# Patient Record
Sex: MALE | Race: WHITE
[De-identification: names, ages, dates, MRNs, and addresses within clinical notes are randomized per-mention and may not be internally consistent; named-entity substitution may affect disease eponyms.]

---

## 2017-12-28 ENCOUNTER — RECORDS - HEALTHEAST (OUTPATIENT)
Dept: ADMINISTRATIVE | Facility: OTHER | Age: 57
End: 2017-12-28

## 2018-01-25 ENCOUNTER — TRANSFERRED RECORDS (OUTPATIENT)
Dept: HEALTH INFORMATION MANAGEMENT | Facility: CLINIC | Age: 58
End: 2018-01-25

## 2018-03-14 VITALS
HEART RATE: 89 BPM | DIASTOLIC BLOOD PRESSURE: 77 MMHG | TEMPERATURE: 98.1 F | OXYGEN SATURATION: 98 % | RESPIRATION RATE: 18 BRPM | SYSTOLIC BLOOD PRESSURE: 109 MMHG
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
## 2018-03-14 PROBLEM — R13.12 OROPHARYNGEAL DYSPHAGIA: Status: ACTIVE | Noted: 2018-03-14

## 2018-03-14 PROBLEM — J96.01 ACUTE RESPIRATORY FAILURE WITH HYPOXIA (H): Status: ACTIVE | Noted: 2018-03-14

## 2018-03-14 PROBLEM — J95.821 RESPIRATORY FAILURE FOLLOWING TRAUMA AND SURGERY (H): Status: ACTIVE | Noted: 2018-03-14

## 2018-03-14 PROBLEM — I82.409 DVT OF LOWER EXTREMITY (DEEP VENOUS THROMBOSIS) (H): Status: ACTIVE | Noted: 2018-03-14

## 2018-03-14 PROBLEM — E46 MALNUTRITION, UNSPECIFIED TYPE (H): Status: ACTIVE | Noted: 2018-03-14

## 2018-03-14 PROBLEM — G93.40 ACUTE ENCEPHALOPATHY: Status: ACTIVE | Noted: 2018-03-14

## 2018-03-14 PROBLEM — Z93.0 TRACHEOSTOMY IN PLACE (H): Status: ACTIVE | Noted: 2018-03-14

## 2018-03-14 PROBLEM — J18.9 HAP (HOSPITAL-ACQUIRED PNEUMONIA): Status: ACTIVE | Noted: 2018-03-14

## 2018-03-14 RX ORDER — ESCITALOPRAM OXALATE 10 MG/1
10 TABLET ORAL DAILY
COMMUNITY
End: 2018-03-18

## 2018-03-14 RX ORDER — MIRTAZAPINE 15 MG/1
TABLET, FILM COATED ORAL AT BEDTIME
COMMUNITY
End: 2018-03-18

## 2018-03-14 RX ORDER — LANOLIN ALCOHOL/MO/W.PET/CERES
3 CREAM (GRAM) TOPICAL AT BEDTIME
COMMUNITY
End: 2018-07-19

## 2018-03-14 RX ORDER — GUAR GUM
1 PACKET (EA) ORAL 2 TIMES DAILY PRN
COMMUNITY

## 2018-03-14 RX ORDER — PRAVASTATIN SODIUM 20 MG
40 TABLET ORAL AT BEDTIME
COMMUNITY

## 2018-03-14 RX ORDER — POLYETHYLENE GLYCOL 3350 17 G/17G
17 POWDER, FOR SOLUTION ORAL 2 TIMES DAILY PRN
COMMUNITY

## 2018-03-14 RX ORDER — GABAPENTIN 100 MG/1
100 CAPSULE ORAL 2 TIMES DAILY
COMMUNITY

## 2018-03-14 RX ORDER — TRAZODONE HYDROCHLORIDE 50 MG/1
50 TABLET, FILM COATED ORAL
COMMUNITY
End: 2018-04-11

## 2018-03-15 ENCOUNTER — NURSING HOME VISIT (OUTPATIENT)
Dept: GERIATRICS | Facility: CLINIC | Age: 58
End: 2018-03-15
Payer: MEDICAID

## 2018-03-15 DIAGNOSIS — F43.21 ADJUSTMENT DISORDER WITH DEPRESSED MOOD: ICD-10-CM

## 2018-03-15 DIAGNOSIS — G91.9 HYDROCEPHALUS (H): ICD-10-CM

## 2018-03-15 DIAGNOSIS — Z59.00 HOMELESS: ICD-10-CM
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
## 2018-03-15 DIAGNOSIS — G62.9 NEUROPATHY: ICD-10-CM

## 2018-03-15 DIAGNOSIS — E46 MALNUTRITION, UNSPECIFIED TYPE (H): ICD-10-CM

## 2018-03-15 DIAGNOSIS — J95.821 RESPIRATORY FAILURE FOLLOWING TRAUMA AND SURGERY (H): ICD-10-CM

## 2018-03-15 DIAGNOSIS — Z98.2 S/P VENTRICULOPERITONEAL SHUNT: ICD-10-CM

## 2018-03-15 DIAGNOSIS — I60.9 SAH (SUBARACHNOID HEMORRHAGE) (H): Primary | ICD-10-CM

## 2018-03-15 DIAGNOSIS — Z98.890 STATUS POST COIL EMBOLIZATION OF CEREBRAL ANEURYSM: ICD-10-CM

## 2018-03-15 DIAGNOSIS — E78.5 HYPERLIPIDEMIA, UNSPECIFIED HYPERLIPIDEMIA TYPE: ICD-10-CM

## 2018-03-15 DIAGNOSIS — K59.01 SLOW TRANSIT CONSTIPATION: ICD-10-CM

## 2018-03-15 DIAGNOSIS — R52 PAIN: ICD-10-CM

## 2018-03-15 DIAGNOSIS — J18.9 HAP (HOSPITAL-ACQUIRED PNEUMONIA): ICD-10-CM

## 2018-03-15 DIAGNOSIS — I82.5Z1 CHRONIC DEEP VEIN THROMBOSIS (DVT) OF DISTAL VEIN OF RIGHT LOWER EXTREMITY (H): ICD-10-CM

## 2018-03-15 DIAGNOSIS — Z93.0 STATUS POST TRACHEOSTOMY (H): ICD-10-CM

## 2018-03-15 DIAGNOSIS — R53.81 PHYSICAL DECONDITIONING: ICD-10-CM

## 2018-03-15 DIAGNOSIS — I82.C21 INTERNAL JUGULAR (IJ) VEIN THROMBOEMBOLISM, CHRONIC, RIGHT (H): ICD-10-CM

## 2018-03-15 DIAGNOSIS — G47.01 INSOMNIA DUE TO MEDICAL CONDITION: ICD-10-CM

## 2018-03-15 DIAGNOSIS — R13.12 OROPHARYNGEAL DYSPHAGIA: ICD-10-CM

## 2018-03-15 PROCEDURE — 99310 SBSQ NF CARE HIGH MDM 45: CPT | Performed by: NURSE PRACTITIONER

## 2018-03-15 SDOH — ECONOMIC STABILITY - HOUSING INSECURITY: HOMELESSNESS UNSPECIFIED: Z59.00

## 2018-03-15 NOTE — LETTER
3/15/2018        RE: Cornelius Wolfe  [address] [facility] [address]        [facility]    PRIMARY CARE PROVIDER AND CLINIC:  No Ref-Primary, Physician     Chief Complaint   Patient presents with     Hospital F/U       HPI:    Cornelius Wolfe is a 57 year old  (1960),admitted to the [facility] from [facility];.  Hospital stay 1/25/2018 through 3/13/2018.  Admitted to this facility for  rehab, medical management and nursing care.  HPI information obtained from: facility chart records, facility staff and patient report.       SUMMARY OF DISCHARGE COURSE         ________________________________________  Current issues are:    SAH (subarachnoid hemorrhage) (H)  Status post coil embolization of cerebral aneurysm  As noted above  Patient independent at this point in most ADLs requiring a less intense level of therapies prior to d/c back into the community  Continues with neuropathic pain to hands as noted below  PT/OT following in facility:  PT:    WBAT    No assistive devices    Ambulating 15' x2, 50' with SBA    Independent with transfers    NU Step x 15' level 1 with B LE and UE use, then decreased to LE use only after a few minutes d/t hand discomfort/numbness    Standing ex x 20 reps with B UE support on //bars    Seated 3# B LE PRE's x20 reps and GTB HS Curls x 20 reps.  OT:    CPT MED BOX- 5.0/6.0    SAFETY-  17 / 18    OT administered the SLUMS to pt.  Pt. scored 20/30.  This score suggests dementia cognition.    Pt. scored 22/30 on the MMSE-2.  This score suggests Mild cognitive impairment.      Hydrocephalus  S/P ventriculoperitoneal shunt  As noted above  Denies headaches or increased difficulties with balance; no noted confusion  Baseline cognitive impairment as noted above    Respiratory failure following trauma and surgery (H)  Status post tracheostomy (H)  HAP (hospital-acquired pneumonia)  Denies cough/SOB/TORRES today  Trach scar healed well w/o  difficulty    Oropharyngeal dysphagia  As noted above  Patient tolerating regular diet without difficulty    Malnutrition, unspecified type (H)  Admit weight 166lbs  Diet as noted above   Does receive house supplement BID  Dietary following    Chronic deep vein thrombosis (DVT) of distal vein of right lower extremity (H)  Internal jugular (IJ) vein thromboembolism, chronic, right (H)  As noted above  No current anticoagulation 2/2 hemorrhage as noted above    Homeless  2/2 to extended hospitalization  He states he did have his own apartment prior to this event, but he no longer has this.  He notes that he has been working with his sons at locating a place for him to move following his stay in the facility - He is educated on the assistance of SW and relocation that is available to assist him    Neuropathy  2/2 to subarachnoid hemorrhage as noted above  Baseline in hands and increases with activity as noted with therapy notes  Currently on regimen of Gabapentin and PRN Tylenol #2  Did speak with him about potentially increasing the Gabapentin for increased control, he declines this at this time and would like to see how things change with therapies    Insomnia due to medical condition  Adjustment disorder with depressed mood  Admitted on regimen of Lexapro, Remeron, Trazodone and Melatonin  He declined both Lexapro and Remeron upon arrival to the facility, he notes that he is stressed and slightly depressed at times but feels as though he is able to cope with these things at this time without these medications on board  Did speak with him about depression being completely normal following his extensive health concerns since December and assured him that we would be here to work with him through these things and that we also have in-house psychology/psychiatry available to him as he wishes  Will continue Remeron and Melatonin at bed to help with sleeping    Hyperlipidemia, unspecified hyperlipidemia  type  Chronic  TRIGLYCERIDES (12/27/2017 3:48 AM)  TRIGLYCERIDES (12/27/2017 3:48 AM)   Component Value Ref Range   TRIGLYCERIDES 77 <150 mg/dL   On regimen of Pravachol     Slow transit constipation  Patient denies difficulties with constipation today  On regimen of Miralax and Nutrisource fiber    Pain  2/2 above noted diagnoses and hosptializations  Managed on Tylenol #3 and Gabapentin as noted above    Physical deconditioning  2/2 above noted diagnoses and hospitalizations    CODE STATUS/ADVANCE DIRECTIVES DISCUSSION:   CPR/Full code   Patient's living condition: lives alone    ALLERGIES:No known allergies  PAST MEDICAL HISTORY:  has no past medical history on file.  PAST SURGICAL HISTORY:  has no past surgical history on file.  FAMILY HISTORY: family history is not on file.  SOCIAL HISTORY:      Post Discharge Medication Reconciliation Status: discharge medications reconciled, continue medications without change.  Current Outpatient Prescriptions   Medication Sig Dispense Refill     acetaminophen-codeine (TYLENOL #3) 300-30 MG per tablet Take 1-2 tablets by mouth every 4 hours as needed for moderate pain       gabapentin (NEURONTIN) 100 MG capsule Take 100 mg by mouth 2 times daily       escitalopram (LEXAPRO) 10 MG tablet Take 10 mg by mouth daily       melatonin 3 MG tablet Take 3 mg by mouth At Bedtime       polyethylene glycol (MIRALAX/GLYCOLAX) powder Take 17 g by mouth 2 times daily as needed for constipation       mirtazapine (REMERON) 15 MG tablet Take by mouth At Bedtime       fiber modular (NUTRISOURCE FIBER) packet 1 packet 2 times daily as needed       pravastatin (PRAVACHOL) 20 MG tablet Take 40 mg by mouth At Bedtime       traZODone (DESYREL) 50 MG tablet Take 50 mg by mouth nightly as needed for sleep         ROS:  10 point ROS of systems including Constitutional, Eyes, Respiratory, Cardiovascular, Gastroenterology, Genitourinary, Integumentary, Muscularskeletal, Psychiatric were all negative  except for pertinent positives noted in my HPI.    Exam:  /77  Pulse 89  Temp 98.1  F (36.7  C)  Resp 18  SpO2 98%  GENERAL APPEARANCE:  Alert, in no distress, oriented, thin, somnolent, cooperative, middle-aged man coming into room from dining room after lunch  ENT:  Mouth and posterior oropharynx normal, moist mucous membranes, normal hearing acuity, poor dentition  EYES:  EOM, conjunctivae, lids, pupils and irises normal, wears glasses  NECK:  No adenopathy,masses or thyromegaly, trach scar present  RESP:  respiratory effort and palpation of chest normal, lungs clear to auscultation , no respiratory distress  CV:  Palpation and auscultation of heart done , regular rate and rhythm, no murmur, rub, or gallop, no edema, +2 pedal pulses  ABDOMEN:  normal bowel sounds, soft, nontender, no hepatosplenomegaly or other masses  M/S:   Gait and station normal  Digits and nails normal  SKIN:  Inspection of skin and subcutaneous tissue baseline, Palpation of skin and subcutaneous tissue baseline  NEURO:   Cranial nerves 2-12 are normal tested and grossly at patient's baseline  PSYCH:  oriented X 3, normal insight, judgement and memory, affect and mood normal, flat, withdrawn     BP Readin/68, 99/62            HR:      Lab/Diagnostic data:  Hemoglobin (2018 9:10 AM)  Hemoglobin (2018 9:10 AM)   Component Value Ref Range   Hemoglobin 12.5 (L) 14.0 - 18.0 g/dL   Cortisol (2018 8:49 AM)  Cortisol (2018 8:49 AM)   Component Value Ref Range   Cortisol 5.9 ug/dL   Basic Metabolic Panel (2018 9:20 AM)  Basic Metabolic Panel (2018 9:20 AM)   Component Value Ref Range   Sodium 137 136 - 145 mmol/L   Potassium 4.3 3.5 - 5.0 mmol/L   Chloride 103 98 - 107 mmol/L   CO2 24 22 - 31 mmol/L   Anion Gap, Calculation 10 5 - 18 mmol/L   Glucose 154 (H) 70 - 125 mg/dL   Calcium 9.7 8.5 - 10.5 mg/dL   BUN 23 (H) 8 - 22 mg/dL   Creatinine 0.80 0.70 - 1.30 mg/dL   GFR MDRD Af Amer >60 >60  mL/min/1.73m2   GFR MDRD Non Af Amer >60 >60 mL/min/1.73m2   TSH (03/01/2018 9:20 AM)  TSH (03/01/2018 9:20 AM)   Component Value Ref Range   TSH 3.43 0.30 - 5.00 uIU/mL   HM2(CBC w/o Differential) (02/23/2018 9:04 AM)  HM2(CBC w/o Differential) (02/23/2018 9:04 AM)   Component Value Ref Range   WBC 10.8 4.0 - 11.0 thou/uL   RBC 3.91 (L) 4.40 - 6.20 mill/uL   Hemoglobin 12.6 (L) 14.0 - 18.0 g/dL   Hematocrit 38.0 (L) 40.0 - 54.0 %   MCV 97 80 - 100 fL   MCH 32.2 27.0 - 34.0 pg   MCHC 33.2 32.0 - 36.0 g/dL   RDW 13.9 11.0 - 14.5 %   Platelets 343 140 - 440 thou/uL   MPV 11.8 8.5 - 12.5 fL       ASSESSMENT/PLAN:   Diagnosis Comments   1. SAH (subarachnoid hemorrhage) (H)   Status post coil embolization of cerebral aneurysm  Patient followed closely by Neurology  Working with therapies - adv per their recommendations  Stable at this time  F/u with Neurology per their recommendations   2. Hydrocephalus    S/P ventriculoperitoneal shunt     Stable  VSS   3. Respiratory failure following trauma and surgery (H)    Status post tracheostomy (H)    HAP (hospital-acquired pneumonia)     No respiratory concerns at this time  Resolved  CBC   4. Oropharyngeal dysphagia Admitted on a regular diet  Doesn't appear to be having ongoing difficulties with dysphagia at this time   5. Malnutrition, unspecified type (H)  Dietary involvement  Continue to monitor patient's weight  Adapt as needed  BMP   6. Chronic deep vein thrombosis (DVT) of distal vein of right lower extremity (H)    Internal jugular (IJ) vein thromboembolism, chronic, right (H)     No anticoagulation as noted above  Continue to monitor for s/sx of recurrence   7. Homeless  Working with sons/SW on placement at this time   8. Neuropathy  Managed on regimen of Gabapentin with good relief   9. Insomnia due to medical condition  Adjustment disorder with depressed mood Declines in-house psych involvement at this time; did reassure their availability to him as he wishes  Was  admitted on regimen of Lexapro and Remeron - he has declined these medications since admission to facility; please monitor for ongoing/increased sx of depression with cessation of this medication  Continues on regimen of Trazodone and Melatonin    10. Hyperlipidemia, unspecified hyperlipidemia type  Chronic  Fasting lipid panel  Continue Pravachol as ordered   11. Slow transit constipation Stable on current regimen; continue medications as currently ordered.   12. Pain  Stable on current regimen; continue medications as currently ordered.   13. Physical deconditioning  PT/OT - adv per their recommendations     Electronically signed by:  SHANICE Markham CNP      Sincerely,        SHANICE Markham CNP

## 2018-03-15 NOTE — PROGRESS NOTES
[facility]    PRIMARY CARE PROVIDER AND CLINIC:  No Ref-Primary, Physician     Chief Complaint   Patient presents with     Hospital F/U       HPI:    Cornelius Wolfe is a 57 year old  (1960),admitted to the [facility] from [facility];.  Hospital stay 1/25/2018 through 3/13/2018.  Admitted to this facility for  rehab, medical management and nursing care.  HPI information obtained from: facility chart records, facility staff and patient report.       SUMMARY OF DISCHARGE COURSE         ________________________________________  Current issues are:    SAH (subarachnoid hemorrhage) (H)  Status post coil embolization of cerebral aneurysm  As noted above  Patient independent at this point in most ADLs requiring a less intense level of therapies prior to d/c back into the community  Continues with neuropathic pain to hands as noted below  PT/OT following in facility:  PT:    WBAT    No assistive devices    Ambulating 15' x2, 50' with SBA    Independent with transfers    NU Step x 15' level 1 with B LE and UE use, then decreased to LE use only after a few minutes d/t hand discomfort/numbness    Standing ex x 20 reps with B UE support on //bars    Seated 3# B LE PRE's x20 reps and GTB HS Curls x 20 reps.  OT:    CPT MED BOX- 5.0/6.0    SAFETY-  17 / 18    OT administered the SLUMS to pt.  Pt. scored 20/30.  This score suggests dementia cognition.    Pt. scored 22/30 on the MMSE-2.  This score suggests Mild cognitive impairment.      Hydrocephalus  S/P ventriculoperitoneal shunt  As noted above  Denies headaches or increased difficulties with balance; no noted confusion  Baseline cognitive impairment as noted above    Respiratory failure following trauma and surgery (H)  Status post tracheostomy (H)  HAP (hospital-acquired pneumonia)  Denies cough/SOB/TORRES today  Trach scar healed well w/o difficulty    Oropharyngeal dysphagia  As noted above  Patient tolerating regular  diet without difficulty    Malnutrition, unspecified type (H)  Admit weight 166lbs  Diet as noted above   Does receive house supplement BID  Dietary following    Chronic deep vein thrombosis (DVT) of distal vein of right lower extremity (H)  Internal jugular (IJ) vein thromboembolism, chronic, right (H)  As noted above  No current anticoagulation 2/2 hemorrhage as noted above    Homeless  2/2 to extended hospitalization  He states he did have his own apartment prior to this event, but he no longer has this.  He notes that he has been working with his sons at locating a place for him to move following his stay in the facility - He is educated on the assistance of SW and relocation that is available to assist him    Neuropathy  2/2 to subarachnoid hemorrhage as noted above  Baseline in hands and increases with activity as noted with therapy notes  Currently on regimen of Gabapentin and PRN Tylenol #2  Did speak with him about potentially increasing the Gabapentin for increased control, he declines this at this time and would like to see how things change with therapies    Insomnia due to medical condition  Adjustment disorder with depressed mood  Admitted on regimen of Lexapro, Remeron, Trazodone and Melatonin  He declined both Lexapro and Remeron upon arrival to the facility, he notes that he is stressed and slightly depressed at times but feels as though he is able to cope with these things at this time without these medications on board  Did speak with him about depression being completely normal following his extensive health concerns since December and assured him that we would be here to work with him through these things and that we also have in-house psychology/psychiatry available to him as he wishes  Will continue Remeron and Melatonin at bed to help with sleeping    Hyperlipidemia, unspecified hyperlipidemia type  Chronic  TRIGLYCERIDES (12/27/2017 3:48 AM)  TRIGLYCERIDES (12/27/2017 3:48 AM)   Component  Value Ref Range   TRIGLYCERIDES 77 <150 mg/dL   On regimen of Pravachol     Slow transit constipation  Patient denies difficulties with constipation today  On regimen of Miralax and Nutrisource fiber    Pain  2/2 above noted diagnoses and hosptializations  Managed on Tylenol #3 and Gabapentin as noted above    Physical deconditioning  2/2 above noted diagnoses and hospitalizations    CODE STATUS/ADVANCE DIRECTIVES DISCUSSION:   CPR/Full code   Patient's living condition: lives alone    ALLERGIES:No known allergies  PAST MEDICAL HISTORY:  has no past medical history on file.  PAST SURGICAL HISTORY:  has no past surgical history on file.  FAMILY HISTORY: family history is not on file.  SOCIAL HISTORY:      Post Discharge Medication Reconciliation Status: discharge medications reconciled, continue medications without change.  Current Outpatient Prescriptions   Medication Sig Dispense Refill     acetaminophen-codeine (TYLENOL #3) 300-30 MG per tablet Take 1-2 tablets by mouth every 4 hours as needed for moderate pain       gabapentin (NEURONTIN) 100 MG capsule Take 100 mg by mouth 2 times daily       escitalopram (LEXAPRO) 10 MG tablet Take 10 mg by mouth daily       melatonin 3 MG tablet Take 3 mg by mouth At Bedtime       polyethylene glycol (MIRALAX/GLYCOLAX) powder Take 17 g by mouth 2 times daily as needed for constipation       mirtazapine (REMERON) 15 MG tablet Take by mouth At Bedtime       fiber modular (NUTRISOURCE FIBER) packet 1 packet 2 times daily as needed       pravastatin (PRAVACHOL) 20 MG tablet Take 40 mg by mouth At Bedtime       traZODone (DESYREL) 50 MG tablet Take 50 mg by mouth nightly as needed for sleep         ROS:  10 point ROS of systems including Constitutional, Eyes, Respiratory, Cardiovascular, Gastroenterology, Genitourinary, Integumentary, Muscularskeletal, Psychiatric were all negative except for pertinent positives noted in my HPI.    Exam:  /77  Pulse 89  Temp 98.1  F (36.7   C)  Resp 18  SpO2 98%  GENERAL APPEARANCE:  Alert, in no distress, oriented, thin, somnolent, cooperative, middle-aged man coming into room from dining room after lunch  ENT:  Mouth and posterior oropharynx normal, moist mucous membranes, normal hearing acuity, poor dentition  EYES:  EOM, conjunctivae, lids, pupils and irises normal, wears glasses  NECK:  No adenopathy,masses or thyromegaly, trach scar present  RESP:  respiratory effort and palpation of chest normal, lungs clear to auscultation , no respiratory distress  CV:  Palpation and auscultation of heart done , regular rate and rhythm, no murmur, rub, or gallop, no edema, +2 pedal pulses  ABDOMEN:  normal bowel sounds, soft, nontender, no hepatosplenomegaly or other masses  M/S:   Gait and station normal  Digits and nails normal  SKIN:  Inspection of skin and subcutaneous tissue baseline, Palpation of skin and subcutaneous tissue baseline  NEURO:   Cranial nerves 2-12 are normal tested and grossly at patient's baseline  PSYCH:  oriented X 3, normal insight, judgement and memory, affect and mood normal, flat, withdrawn     BP Readin/68, 99/62            HR:      Lab/Diagnostic data:  Hemoglobin (2018 9:10 AM)  Hemoglobin (2018 9:10 AM)   Component Value Ref Range   Hemoglobin 12.5 (L) 14.0 - 18.0 g/dL   Cortisol (2018 8:49 AM)  Cortisol (2018 8:49 AM)   Component Value Ref Range   Cortisol 5.9 ug/dL   Basic Metabolic Panel (2018 9:20 AM)  Basic Metabolic Panel (2018 9:20 AM)   Component Value Ref Range   Sodium 137 136 - 145 mmol/L   Potassium 4.3 3.5 - 5.0 mmol/L   Chloride 103 98 - 107 mmol/L   CO2 24 22 - 31 mmol/L   Anion Gap, Calculation 10 5 - 18 mmol/L   Glucose 154 (H) 70 - 125 mg/dL   Calcium 9.7 8.5 - 10.5 mg/dL   BUN 23 (H) 8 - 22 mg/dL   Creatinine 0.80 0.70 - 1.30 mg/dL   GFR MDRD Af Amer >60 >60 mL/min/1.73m2   GFR MDRD Non Af Amer >60 >60 mL/min/1.73m2   TSH (2018 9:20 AM)  TSH  (03/01/2018 9:20 AM)   Component Value Ref Range   TSH 3.43 0.30 - 5.00 uIU/mL   HM2(CBC w/o Differential) (02/23/2018 9:04 AM)  HM2(CBC w/o Differential) (02/23/2018 9:04 AM)   Component Value Ref Range   WBC 10.8 4.0 - 11.0 thou/uL   RBC 3.91 (L) 4.40 - 6.20 mill/uL   Hemoglobin 12.6 (L) 14.0 - 18.0 g/dL   Hematocrit 38.0 (L) 40.0 - 54.0 %   MCV 97 80 - 100 fL   MCH 32.2 27.0 - 34.0 pg   MCHC 33.2 32.0 - 36.0 g/dL   RDW 13.9 11.0 - 14.5 %   Platelets 343 140 - 440 thou/uL   MPV 11.8 8.5 - 12.5 fL       ASSESSMENT/PLAN:   Diagnosis Comments   1. SAH (subarachnoid hemorrhage) (H)   Status post coil embolization of cerebral aneurysm  Patient followed closely by Neurology  Working with therapies - adv per their recommendations  Stable at this time  F/u with Neurology per their recommendations   2. Hydrocephalus    S/P ventriculoperitoneal shunt     Stable  VSS   3. Respiratory failure following trauma and surgery (H)    Status post tracheostomy (H)    HAP (hospital-acquired pneumonia)     No respiratory concerns at this time  Resolved  CBC   4. Oropharyngeal dysphagia Admitted on a regular diet  Doesn't appear to be having ongoing difficulties with dysphagia at this time   5. Malnutrition, unspecified type (H)  Dietary involvement  Continue to monitor patient's weight  Adapt as needed  BMP   6. Chronic deep vein thrombosis (DVT) of distal vein of right lower extremity (H)    Internal jugular (IJ) vein thromboembolism, chronic, right (H)     No anticoagulation as noted above  Continue to monitor for s/sx of recurrence   7. Homeless  Working with sons/SW on placement at this time   8. Neuropathy  Managed on regimen of Gabapentin with good relief   9. Insomnia due to medical condition  Adjustment disorder with depressed mood Declines in-house psych involvement at this time; did reassure their availability to him as he wishes  Was admitted on regimen of Lexapro and Remeron - he has declined these medications since  admission to facility; please monitor for ongoing/increased sx of depression with cessation of this medication  Continues on regimen of Trazodone and Melatonin    10. Hyperlipidemia, unspecified hyperlipidemia type  Chronic  Fasting lipid panel  Continue Pravachol as ordered   11. Slow transit constipation Stable on current regimen; continue medications as currently ordered.   12. Pain  Stable on current regimen; continue medications as currently ordered.   13. Physical deconditioning  PT/OT - adv per their recommendations     Electronically signed by:  SHANICE Markham CNP

## 2018-03-16 ENCOUNTER — TRANSFERRED RECORDS (OUTPATIENT)
Dept: HEALTH INFORMATION MANAGEMENT | Facility: CLINIC | Age: 58
End: 2018-03-16

## 2018-03-16 LAB
BUN SERPL-MCNC: 16 MG/DL (ref 9–26)
CALCIUM SERPL-MCNC: 9.7 MG/DL (ref 8.4–10.2)
CHLORIDE SERPLBLD-SCNC: 108 MMOL/L (ref 98–109)
CHOLEST SERPL-MCNC: 124 MG/DL (ref 0–199)
CO2 SERPL-SCNC: 27 MMOL/L (ref 22–31)
CREAT SERPL-MCNC: 0.87 MG/DL (ref 0.73–1.18)
DIFFERENTIAL: ABNORMAL
ERYTHROCYTE [DISTWIDTH] IN BLOOD BY AUTOMATED COUNT: 13.8 % (ref 11–15)
GFR SERPL CREATININE-BSD FRML MDRD: >60 ML/MIN/1.73M2
GLUCOSE SERPL-MCNC: 88 MG/DL (ref 70–100)
HCT VFR BLD AUTO: 37.4 % (ref 39–51)
HDLC SERPL-MCNC: 48 MG/DL
HEMOGLOBIN: 12 G/DL (ref 13.4–17.5)
LDLC SERPL CALC-MCNC: 56 MG/DL (ref 19–130)
MCV RBC AUTO: 98.4 FL (ref 80–100)
PLATELET # BLD AUTO: 327 K/CMM (ref 140–450)
POTASSIUM SERPL-SCNC: 4.3 MMOL/L (ref 3.5–5.2)
RBC # BLD AUTO: 3.8 M/CMM (ref 4.2–5.9)
SODIUM SERPL-SCNC: 140 MMOL/L (ref 136–145)
TRIGL SERPL-MCNC: 99 MG/DL (ref 4–149)
WBC # BLD AUTO: 7.9 K/CMM (ref 3.8–11)

## 2018-03-18 VITALS
DIASTOLIC BLOOD PRESSURE: 74 MMHG | WEIGHT: 167.1 LBS | RESPIRATION RATE: 18 BRPM | SYSTOLIC BLOOD PRESSURE: 110 MMHG | OXYGEN SATURATION: 98 % | TEMPERATURE: 98.8 F | HEART RATE: 86 BPM

## 2018-03-19 ENCOUNTER — NURSING HOME VISIT (OUTPATIENT)
Dept: GERIATRICS | Facility: CLINIC | Age: 58
End: 2018-03-19
Payer: MEDICAID

## 2018-03-19 DIAGNOSIS — I60.9 SAH (SUBARACHNOID HEMORRHAGE) (H): Primary | ICD-10-CM

## 2018-03-19 DIAGNOSIS — E44.0 MODERATE PROTEIN-CALORIE MALNUTRITION (H): ICD-10-CM

## 2018-03-19 DIAGNOSIS — I60.7 RUPTURED ANEURYSM OF INTRACRANIAL ARTERY (H): ICD-10-CM

## 2018-03-19 DIAGNOSIS — G47.01 INSOMNIA DUE TO MEDICAL CONDITION: ICD-10-CM

## 2018-03-19 DIAGNOSIS — G62.9 PERIPHERAL POLYNEUROPATHY: ICD-10-CM

## 2018-03-19 DIAGNOSIS — I82.401 DEEP VEIN THROMBOSIS (DVT) OF RIGHT LOWER EXTREMITY, UNSPECIFIED CHRONICITY, UNSPECIFIED VEIN (H): ICD-10-CM

## 2018-03-19 DIAGNOSIS — R53.81 PHYSICAL DECONDITIONING: ICD-10-CM

## 2018-03-19 DIAGNOSIS — G93.40 ENCEPHALOPATHY: ICD-10-CM

## 2018-03-19 DIAGNOSIS — E78.5 HYPERLIPIDEMIA, UNSPECIFIED HYPERLIPIDEMIA TYPE: ICD-10-CM

## 2018-03-19 PROCEDURE — 99306 1ST NF CARE HIGH MDM 50: CPT | Performed by: INTERNAL MEDICINE

## 2018-03-19 NOTE — PROGRESS NOTES
[facility]  INITIAL VISIT NOTE  March 19, 2018    PRIMARY CARE PROVIDER AND CLINIC:  No Ref-Primary, Physician     Chief Complaint   Patient presents with     Hospital F/U       HPI:    Cornelius Wolfe is a 57 year old  (1960) male who was seen at [facility] on March 19, 2018 for an initial visit. He has no significant past medical history. He was hospitalized at [facility] from 12/9/17 to 1/25/18 where he presented to the ER with AMS after being found down. 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 Hospital course notable for s/p GJ and trach (12/10/17) as well as hydrocephalus s/p  shunt (1/4/18). He had a positive CSF culture, but ID felt was contaminant. He was discharged to [facility] from 1/25/18 to 3/13/18. There, CT head with stable ventriculomegaly. He completed seizure prophylaxis.  He was decannulated on 2/14/18 and GJ was removed on 3/13/18. He was started on gabapentin for peripheral neuropathy involving his hands. Psychiatry followed and he was started on escitalopram and mirtazapine. Of note, d/c summary highlights DVTs involving R IJ, R cephalic vein and R tibial and peroneal veins. He is not anticoagulated given SAH. He was admitted to this facility for medical management and rehab.     Today, Mr. Wolfe is seen in his room. Processing speed is slow with response to questions. His main concern is numbness in his bilateral hands that ends at the wrist. No numbness in his feet. He correlates the cause of this numbness to a chemical exposure at work years ago.     CODE STATUS:   CPR/Full code     ALLERGIES:     Allergies   Allergen Reactions     No Known Allergies        PAST MEDICAL HISTORY:   No significant past medical history     PAST SURGICAL HISTORY:   No past surgical history on file.    FAMILY HISTORY:   Reviewed and non-contributory    SOCIAL HISTORY:   Lives alone     MEDICATIONS:  Current Outpatient  Prescriptions   Medication Sig Dispense Refill     acetaminophen-codeine (TYLENOL #3) 300-30 MG per tablet Take 1-2 tablets by mouth every 4 hours as needed for moderate pain       gabapentin (NEURONTIN) 100 MG capsule Take 100 mg by mouth 2 times daily       melatonin 3 MG tablet Take 3 mg by mouth At Bedtime       polyethylene glycol (MIRALAX/GLYCOLAX) powder Take 17 g by mouth 2 times daily as needed for constipation       fiber modular (NUTRISOURCE FIBER) packet 1 packet 2 times daily as needed       pravastatin (PRAVACHOL) 20 MG tablet Take 40 mg by mouth At Bedtime       traZODone (DESYREL) 50 MG tablet Take 50 mg by mouth nightly as needed for sleep         Post Discharge Medication Reconciliation Status: medication reconcilation previously completed during another office visit.    ROS:  10 point ROS neg other than the symptoms noted above in the HPI    PHYSICAL EXAM:  /74  Pulse 86  Temp 98.8  F (37.1  C)  Resp 18  Wt 167 lb 1.6 oz (75.8 kg)  SpO2 98%  Gen: sitting on edge of bed, alert, cooperative and in no acute distress  HEENT: normocephalic; oropharynx clear  Card: RRR, S1, S2, no murmurs  Resp: lungs clear to auscultation bilaterally  GI: abdomen soft, not-tender  MSK: normal muscle tone, no LE edema  Neuro: CX II-XII grossly in tact; ROM in all four extremities grossly in tact  Psych: alert and oriented to self and general situation; slow processing speed; normal affect    LABORATORY/IMAGING DATA:  Reviewed as per Epic    ASSESSMENT/PLAN:    SAH Secondary to Ruptured Basilar Tip Aneurysm s/p Coiling (12/10/18)  Up and ambulatory around his room. Conversant, though will slow processing speed. Denies headache. He is an assist of 1 with most ADLs. He is continent with bowel and bladder.   -- PT/OT  -- follow up with Dr. Balbuena with neurology as scheduled tomorrow 3/20/18    Encephalopathy  In setting of above. Processing speed is slow. Oriented to self and general situation. He was started on  escitalopram and mirtazapine at [facility] - both have been d/c per patient request.    -- OT following for cog assessment    DVT  Per d/c summary multiple DVTs involving R IJ, R cephalic vein and R tibial and peroneal veins. He is not anticoagulated given SAH.   -- follow clinically    Peripheral Neuropathy  In glove pattern of both hands. Not of feet. D/c summary notes likely secondary to EtOH; however, he states it is from a past chemical exposure at work. The gabapentin started at [facility] has helped.   -- continues on gabapentin 100 mg BID   -- can consider increasing dose if truly helping    Moderate Protein Calorie Malnutrition  GJ removed on 3/13/18. He's on a regular diet with thin liquids  -- nutrition following    HLD  -- continues on pravastatin 40 mg qhs    Insomnia  -- continues on melatonin 3 mg qhs and trazodone 50 mg qhs PRN    Physical Deconditioning  In setting of hospitalization and underlying medical conditions. He is an assist of 1 with most ADLs. He is continent with bowel and bladder.   -- ongoing PT/OT      Electronically signed by:  Aracely Murillo MD

## 2018-03-19 NOTE — LETTER
3/19/2018        RE: Cornelius Wolfe  [id number] [facility] [address]        [facility]  INITIAL VISIT NOTE  March 19, 2018    PRIMARY CARE PROVIDER AND CLINIC:  No Ref-Primary, Physician     Chief Complaint   Patient presents with     Hospital F/U       HPI:    Cornelius Wolfe is a 57 year old  (1960) male who was seen at [facility] on March 19, 2018 for an initial visit. He has no significant past medical history. He was hospitalized at [facility] from 12/9/17 to 1/25/18 where he presented to the ER with AMS after being found down. 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 Hospital course notable for s/p GJ and trach (12/10/17) as well as hydrocephalus s/p  shunt (1/4/18). He had a positive CSF culture, but ID felt was contaminant. He was discharged to [facility] from 1/25/18 to 3/13/18. There, CT head with stable ventriculomegaly. He completed seizure prophylaxis.  He was decannulated on 2/14/18 and GJ was removed on 3/13/18. He was started on gabapentin for peripheral neuropathy involving his hands. Psychiatry followed and he was started on escitalopram and mirtazapine. Of note, d/c summary highlights DVTs involving R IJ, R cephalic vein and R tibial and peroneal veins. He is not anticoagulated given SAH. He was admitted to this facility for medical management and rehab.     Today, Mr. Wolfe is seen in his room. Processing speed is slow with response to questions. His main concern is numbness in his bilateral hands that ends at the wrist. No numbness in his feet. He correlates the cause of this numbness to a chemical exposure at work years ago.     CODE STATUS:   CPR/Full code     ALLERGIES:     Allergies   Allergen Reactions     No Known Allergies        PAST MEDICAL HISTORY:   No significant past medical history     PAST SURGICAL HISTORY:   No past surgical history on file.    FAMILY HISTORY:   Reviewed and  non-contributory    SOCIAL HISTORY:   Lives alone     MEDICATIONS:  Current Outpatient Prescriptions   Medication Sig Dispense Refill     acetaminophen-codeine (TYLENOL #3) 300-30 MG per tablet Take 1-2 tablets by mouth every 4 hours as needed for moderate pain       gabapentin (NEURONTIN) 100 MG capsule Take 100 mg by mouth 2 times daily       melatonin 3 MG tablet Take 3 mg by mouth At Bedtime       polyethylene glycol (MIRALAX/GLYCOLAX) powder Take 17 g by mouth 2 times daily as needed for constipation       fiber modular (NUTRISOURCE FIBER) packet 1 packet 2 times daily as needed       pravastatin (PRAVACHOL) 20 MG tablet Take 40 mg by mouth At Bedtime       traZODone (DESYREL) 50 MG tablet Take 50 mg by mouth nightly as needed for sleep         Post Discharge Medication Reconciliation Status: medication reconcilation previously completed during another office visit.    ROS:  10 point ROS neg other than the symptoms noted above in the HPI    PHYSICAL EXAM:  /74  Pulse 86  Temp 98.8  F (37.1  C)  Resp 18  Wt 167 lb 1.6 oz (75.8 kg)  SpO2 98%  Gen: sitting on edge of bed, alert, cooperative and in no acute distress  HEENT: normocephalic; oropharynx clear  Card: RRR, S1, S2, no murmurs  Resp: lungs clear to auscultation bilaterally  GI: abdomen soft, not-tender  MSK: normal muscle tone, no LE edema  Neuro: CX II-XII grossly in tact; ROM in all four extremities grossly in tact  Psych: alert and oriented to self and general situation; slow processing speed; normal affect    LABORATORY/IMAGING DATA:  Reviewed as per Epic    ASSESSMENT/PLAN:    SAH Secondary to Ruptured Basilar Tip Aneurysm s/p Coiling (12/10/18)  Up and ambulatory around his room. Conversant, though will slow processing speed. Denies headache. He is an assist of 1 with most ADLs. He is continent with bowel and bladder.   -- PT/OT  -- follow up with Dr. Balbuena with neurology as scheduled tomorrow 3/20/18    Encephalopathy  In setting of above.  Processing speed is slow. Oriented to self and general situation. He was started on escitalopram and mirtazapine at [facility] - both have been d/c per patient request.    -- OT following for cog assessment    DVT  Per d/c summary multiple DVTs involving R IJ, R cephalic vein and R tibial and peroneal veins. He is not anticoagulated given SAH.   -- follow clinically    Peripheral Neuropathy  In glove pattern of both hands. Not of feet. D/c summary notes likely secondary to EtOH; however, he states it is from a past chemical exposure at work. The gabapentin started at [facility] has helped.   -- continues on gabapentin 100 mg BID   -- can consider increasing dose if truly helping    Moderate Protein Calorie Malnutrition  GJ removed on 3/13/18. He's on a regular diet with thin liquids  -- nutrition following    HLD  -- continues on pravastatin 40 mg qhs    Insomnia  -- continues on melatonin 3 mg qhs and trazodone 50 mg qhs PRN    Physical Deconditioning  In setting of hospitalization and underlying medical conditions. He is an assist of 1 with most ADLs. He is continent with bowel and bladder.   -- ongoing PT/OT      Electronically signed by:  Aracely Murillo MD

## 2018-03-22 ENCOUNTER — NURSING HOME VISIT (OUTPATIENT)
Dept: GERIATRICS | Facility: CLINIC | Age: 58
End: 2018-03-22
Payer: MEDICAID

## 2018-03-22 VITALS
BODY MASS INDEX: 23.65 KG/M2 | OXYGEN SATURATION: 93 % | WEIGHT: 168.9 LBS | RESPIRATION RATE: 21 BRPM | HEART RATE: 89 BPM | HEIGHT: 71 IN | SYSTOLIC BLOOD PRESSURE: 113 MMHG | TEMPERATURE: 94.5 F | DIASTOLIC BLOOD PRESSURE: 68 MMHG

## 2018-03-22 DIAGNOSIS — G62.9 NEUROPATHY: ICD-10-CM
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
## 2018-03-22 DIAGNOSIS — Z59.00 HOMELESS: ICD-10-CM

## 2018-03-22 DIAGNOSIS — Z93.0 STATUS POST TRACHEOSTOMY (H): ICD-10-CM

## 2018-03-22 DIAGNOSIS — E46 MALNUTRITION, UNSPECIFIED TYPE (H): ICD-10-CM

## 2018-03-22 DIAGNOSIS — Z98.890 STATUS POST COIL EMBOLIZATION OF CEREBRAL ANEURYSM: ICD-10-CM

## 2018-03-22 DIAGNOSIS — I60.9 SAH (SUBARACHNOID HEMORRHAGE) (H): Primary | ICD-10-CM

## 2018-03-22 DIAGNOSIS — R53.81 PHYSICAL DECONDITIONING: ICD-10-CM

## 2018-03-22 DIAGNOSIS — G47.01 INSOMNIA DUE TO MEDICAL CONDITION: ICD-10-CM

## 2018-03-22 DIAGNOSIS — E78.5 HYPERLIPIDEMIA, UNSPECIFIED HYPERLIPIDEMIA TYPE: ICD-10-CM

## 2018-03-22 DIAGNOSIS — I82.C21 INTERNAL JUGULAR (IJ) VEIN THROMBOEMBOLISM, CHRONIC, RIGHT (H): ICD-10-CM

## 2018-03-22 DIAGNOSIS — R52 PAIN: ICD-10-CM

## 2018-03-22 DIAGNOSIS — K59.01 SLOW TRANSIT CONSTIPATION: ICD-10-CM

## 2018-03-22 DIAGNOSIS — Z98.2 S/P VENTRICULOPERITONEAL SHUNT: ICD-10-CM

## 2018-03-22 DIAGNOSIS — G91.9 HYDROCEPHALUS (H): ICD-10-CM

## 2018-03-22 DIAGNOSIS — J95.821 RESPIRATORY FAILURE FOLLOWING TRAUMA AND SURGERY (H): ICD-10-CM

## 2018-03-22 DIAGNOSIS — J18.9 HAP (HOSPITAL-ACQUIRED PNEUMONIA): ICD-10-CM

## 2018-03-22 DIAGNOSIS — I82.5Z1 CHRONIC DEEP VEIN THROMBOSIS (DVT) OF DISTAL VEIN OF RIGHT LOWER EXTREMITY (H): ICD-10-CM

## 2018-03-22 DIAGNOSIS — R13.12 OROPHARYNGEAL DYSPHAGIA: ICD-10-CM

## 2018-03-22 DIAGNOSIS — F43.21 ADJUSTMENT DISORDER WITH DEPRESSED MOOD: ICD-10-CM

## 2018-03-22 PROCEDURE — 99309 SBSQ NF CARE MODERATE MDM 30: CPT | Performed by: NURSE PRACTITIONER

## 2018-03-22 SDOH — ECONOMIC STABILITY - HOUSING INSECURITY: HOMELESSNESS UNSPECIFIED: Z59.00

## 2018-03-22 NOTE — LETTER
3/22/2018        RE: Cornelius Wolfe  [address] [facility] [address]        [facility]    Chief Complaint   Patient presents with     RECHECK       HPI:    Cornelius Wolfe is a 57 year old  (1960), who is being seen today for an episodic care visit at [facility].  HPI information obtained from: facility chart records, facility staff and patient report.    Current issues are:    SAH (subarachnoid hemorrhage) (H)  Status post coil embolization of cerebral aneurysm      Patient independent at this point in most ADLs requiring a less intense level of therapies prior to d/c back into the community  Continues with neuropathic pain to hands as noted below  PT/OT following in facility:  PT:    WBAT    No assistive devices    Ambulating 15' x2, 50' with SBA    Independent with transfers    Pt performed 20 reps, 3# of resistance, x 1 set. Seated 3# B LE PRE's x 20 reps and NU Step x 15' Level 3 with B LE use only.   OT:    Functional activity:     CPT shop task 5/6.     Pt successful to complete 10/12 5 and 6 step sequencing tasks independently.    CPT MED BOX- 5.0/6.0    SAFETY-  17 / 18    OT administered the SLUMS to pt.  Pt. scored 20/30.  This score suggests dementia cognition.    Pt. scored 22/30 on the MMSE-2.  This score suggests Mild cognitive impairment.    Ambulation 250' x 2- I     12' UBE mid resistance from seated      Hydrocephalus  S/P ventriculoperitoneal shunt  Denies headaches or increased difficulties with balance; no noted confusion  Baseline cognitive impairment as noted above    Respiratory failure following trauma and surgery (H)  Status post tracheostomy (H)  HAP (hospital-acquired pneumonia)  Denies cough/SOB/TORRES today  Trach scar healed well w/o difficulty    Oropharyngeal dysphagia    Patient tolerating regular diet without difficulty    Malnutrition, unspecified type (H)  Admit weight 166lbs  Diet as noted above   Does receive house supplement  "BID  Dietary following  Wt Readings from Last 2 Encounters:   03/22/18 168 lb 14.4 oz (76.6 kg)   03/18/18 167 lb 1.6 oz (75.8 kg)     Chronic deep vein thrombosis (DVT) of distal vein of right lower extremity (H)  Internal jugular (IJ) vein thromboembolism, chronic, right (H)    No current anticoagulation 2/2 hemorrhage as noted above    Homeless  2/2 to extended hospitalization  He states he did have his own apartment prior to this event, but he no longer has this.  He notes that he has been working with his sons at locating a place for him to move following his stay in the facility - He is educated on the assistance of SW and relocation that is available to assist him    Neuropathy  2/2 to subarachnoid hemorrhage as noted above  Baseline in hands and increases with activity as noted with therapy notes -  He states that it's \"okay\" today and continues to decline the need for adjustment - he does note that the pain doesn't effect his ability to complete daily activities  Currently on regimen of Gabapentin and PRN Tylenol #2    Insomnia due to medical condition  Adjustment disorder with depressed mood  Admitted on regimen of Lexapro, Remeron, Trazodone and Melatonin  He declined both Lexapro and Remeron upon arrival to the facility, he notes that he is stressed and slightly depressed at times but feels as though he is able to cope with these things at this time without these medications on board  Did speak with him about depression being completely normal following his extensive health concerns since December and assured him that we would be here to work with him through these things and that we also have in-house psychology/psychiatry available to him as he wishes  Will continue Remeron and Melatonin at bed to help with sleeping    Hyperlipidemia, unspecified hyperlipidemia type  Chronic  Recent Labs   Lab Test 03/16/18   CHOL  124   HDL  48   LDL  56   TRIG  99   On regimen of Pravachol     Slow transit " "constipation  Patient denies difficulties with constipation today  On regimen of Miralax and Nutrisource fiber    Pain  2/2 above noted diagnoses and hosptializations  Managed on Tylenol #3 and Gabapentin as noted above    Physical deconditioning  2/2 above noted diagnoses and hospitalizations  Therapy involved as noted above    ALLERGIES: No known allergies  Past Medical, Surgical, Family and Social History reviewed and updated in [facility].    Current Outpatient Prescriptions   Medication Sig Dispense Refill     acetaminophen-codeine (TYLENOL #3) 300-30 MG per tablet Take 1-2 tablets by mouth every 4 hours as needed for moderate pain       gabapentin (NEURONTIN) 100 MG capsule Take 100 mg by mouth 2 times daily       melatonin 3 MG tablet Take 3 mg by mouth At Bedtime       polyethylene glycol (MIRALAX/GLYCOLAX) powder Take 17 g by mouth 2 times daily as needed for constipation       fiber modular (NUTRISOURCE FIBER) packet 1 packet 2 times daily as needed       pravastatin (PRAVACHOL) 20 MG tablet Take 40 mg by mouth At Bedtime       traZODone (DESYREL) 50 MG tablet Take 50 mg by mouth nightly as needed for sleep       Medications reviewed:  Medications reconciled to facility chart and changes were made to reflect current medications as identified as above med list. Below are the changes that were made:   Medications stopped since last EPIC medication reconciliation:   There are no discontinued medications.    Medications started since last [facility] medication reconciliation:  No orders of the defined types were placed in this encounter.    REVIEW OF SYSTEMS:  4 point ROS including Respiratory, CV, GI and , other than that noted in the HPI,  is negative    Physical Exam:  /68  Pulse 89  Temp 94.5  F (34.7  C)  Resp 21  Ht 5' 11\" (1.803 m)  Wt 168 lb 14.4 oz (76.6 kg)  SpO2 93%  BMI 23.56 kg/m2  BP Readin/65, 110/74, 109/77              HR:    GENERAL APPEARANCE:  Alert, in no distress, " oriented, thin, somnolent, cooperative, middle-aged man eating lunch in dining room  ENT:  Mouth and posterior oropharynx normal, moist mucous membranes, normal hearing acuity, poor dentition  EYES:  EOM, conjunctivae, lids, pupils and irises normal, wears glasses  NECK:  No adenopathy,masses or thyromegaly, trach scar present  RESP:  respiratory effort and palpation of chest normal, lungs clear to auscultation, no respiratory distress  CV:  Palpation and auscultation of heart done, regular rate and rhythm, no murmur, rub, or gallop, no edema, +2 pedal pulses  ABDOMEN:  normal bowel sounds, soft, nontender, no hepatosplenomegaly or other masses  M/S:   Gait and station normal; Digits and nails normal  SKIN:  Inspection of skin and subcutaneous tissue baseline, Palpation of skin and subcutaneous tissue baseline  NEURO:   Cranial nerves 2-12 are normal tested and grossly at patient's baseline  PSYCH:  oriented X 3, normal insight, judgement and memory, affect and mood normal, flat, withdrawn     Recent Labs:   CBC RESULTS:   Recent Labs   Lab Test 03/16/18   WBC  7.9   RBC  3.80*   HGB  12.0*   HCT  37.4*   MCV  98.4   RDW  13.8   PLT  327       Last Basic Metabolic Panel:  Recent Labs   Lab Test 03/16/18   NA  140   POTASSIUM  4.3   CHLORIDE  108   BLUE  9.7   CO2  27   BUN  16   CR  0.87   GLC  88           Assessment/Plan:   Diagnosis Comments   1. SAH (subarachnoid hemorrhage) (H)   Status post coil embolization of cerebral aneurysm  Patient followed closely by Neurology  Working with therapies - adv per their recommendations  Stable at this time  F/u with Neurology per their recommendations   2. Hydrocephalus    S/P ventriculoperitoneal shunt     Stable  VSS   3. Respiratory failure following trauma and surgery (H)    Status post tracheostomy (H)    HAP (hospital-acquired pneumonia)     No respiratory concerns at this time  Resolved   4. Oropharyngeal dysphagia Admitted on a regular diet  Doesn't appear to be  having ongoing difficulties with dysphagia at this time   5. Malnutrition, unspecified type (H)  Dietary involvement  Continue to monitor patient's weight  Adapt as needed   6. Chronic deep vein thrombosis (DVT) of distal vein of right lower extremity (H)    Internal jugular (IJ) vein thromboembolism, chronic, right (H)     No anticoagulation as noted above  Continue to monitor for s/sx of recurrence  Stable without medical intervention   7. Homeless  Working with sons/SW on placement at this time   8. Neuropathy  Managed on regimen of Gabapentin with good relief  Stable on current regimen; continue medications as currently ordered.   9. Insomnia due to medical condition  Adjustment disorder with depressed mood Declines in-house psych involvement at this time; did reassure their availability to him as he wishes  Was admitted on regimen of Lexapro and Remeron - he has declined these medications since admission to facility; please monitor for ongoing/increased sx of depression with cessation of this medication  Continues on regimen of Trazodone and Melatonin   Stable on current regimen; continue medications as currently ordered.   10. Hyperlipidemia, unspecified hyperlipidemia type  Chronic  Stable on current regimen; continue medications as currently ordered.  Continue Pravachol as ordered   11. Slow transit constipation Stable on current regimen; continue medications as currently ordered.   12. Pain  Stable on current regimen; continue medications as currently ordered.   13. Physical deconditioning  PT/OT - adv per their recommendations       Electronically signed by  SHANICE Markham CNP                    Sincerely,        SHANICE Markham CNP

## 2018-03-22 NOTE — PROGRESS NOTES
[facility]    Chief Complaint   Patient presents with     RECHECK       HPI:    Cornelius Wolfe is a 57 year old  (1960), who is being seen today for an episodic care visit at [facility].  HPI information obtained from: facility chart records, facility staff and patient report.    Current issues are:    SAH (subarachnoid hemorrhage) (H)  Status post coil embolization of cerebral aneurysm      Patient independent at this point in most ADLs requiring a less intense level of therapies prior to d/c back into the community  Continues with neuropathic pain to hands as noted below  PT/OT following in facility:  PT:    WBAT    No assistive devices    Ambulating 15' x2, 50' with SBA    Independent with transfers    Pt performed 20 reps, 3# of resistance, x 1 set. Seated 3# B LE PRE's x 20 reps and NU Step x 15' Level 3 with B LE use only.   OT:    Functional activity:     CPT shop task 5/6.     Pt successful to complete 10/12 5 and 6 step sequencing tasks independently.    CPT MED BOX- 5.0/6.0    SAFETY-  17 / 18    OT administered the SLUMS to pt.  Pt. scored 20/30.  This score suggests dementia cognition.    Pt. scored 22/30 on the MMSE-2.  This score suggests Mild cognitive impairment.    Ambulation 250' x 2- I     12' UBE mid resistance from seated      Hydrocephalus  S/P ventriculoperitoneal shunt  Denies headaches or increased difficulties with balance; no noted confusion  Baseline cognitive impairment as noted above    Respiratory failure following trauma and surgery (H)  Status post tracheostomy (H)  HAP (hospital-acquired pneumonia)  Denies cough/SOB/TORRES today  Trach scar healed well w/o difficulty    Oropharyngeal dysphagia    Patient tolerating regular diet without difficulty    Malnutrition, unspecified type (H)  Admit weight 166lbs  Diet as noted above   Does receive house supplement BID  Dietary following  Wt Readings from Last 2 Encounters:   03/22/18 168 lb 14.4 oz (76.6  "kg)   03/18/18 167 lb 1.6 oz (75.8 kg)     Chronic deep vein thrombosis (DVT) of distal vein of right lower extremity (H)  Internal jugular (IJ) vein thromboembolism, chronic, right (H)    No current anticoagulation 2/2 hemorrhage as noted above    Homeless  2/2 to extended hospitalization  He states he did have his own apartment prior to this event, but he no longer has this.  He notes that he has been working with his sons at locating a place for him to move following his stay in the facility - He is educated on the assistance of SW and relocation that is available to assist him    Neuropathy  2/2 to subarachnoid hemorrhage as noted above  Baseline in hands and increases with activity as noted with therapy notes -  He states that it's \"okay\" today and continues to decline the need for adjustment - he does note that the pain doesn't effect his ability to complete daily activities  Currently on regimen of Gabapentin and PRN Tylenol #2    Insomnia due to medical condition  Adjustment disorder with depressed mood  Admitted on regimen of Lexapro, Remeron, Trazodone and Melatonin  He declined both Lexapro and Remeron upon arrival to the facility, he notes that he is stressed and slightly depressed at times but feels as though he is able to cope with these things at this time without these medications on board  Did speak with him about depression being completely normal following his extensive health concerns since December and assured him that we would be here to work with him through these things and that we also have in-house psychology/psychiatry available to him as he wishes  Will continue Remeron and Melatonin at bed to help with sleeping    Hyperlipidemia, unspecified hyperlipidemia type  Chronic  Recent Labs   Lab Test 03/16/18   CHOL  124   HDL  48   LDL  56   TRIG  99   On regimen of Pravachol     Slow transit constipation  Patient denies difficulties with constipation today  On regimen of Miralax and " "Nutrisource fiber    Pain  2/2 above noted diagnoses and hosptializations  Managed on Tylenol #3 and Gabapentin as noted above    Physical deconditioning  2/2 above noted diagnoses and hospitalizations  Therapy involved as noted above    ALLERGIES: No known allergies  Past Medical, Surgical, Family and Social History reviewed and updated in [facility].    Current Outpatient Prescriptions   Medication Sig Dispense Refill     acetaminophen-codeine (TYLENOL #3) 300-30 MG per tablet Take 1-2 tablets by mouth every 4 hours as needed for moderate pain       gabapentin (NEURONTIN) 100 MG capsule Take 100 mg by mouth 2 times daily       melatonin 3 MG tablet Take 3 mg by mouth At Bedtime       polyethylene glycol (MIRALAX/GLYCOLAX) powder Take 17 g by mouth 2 times daily as needed for constipation       fiber modular (NUTRISOURCE FIBER) packet 1 packet 2 times daily as needed       pravastatin (PRAVACHOL) 20 MG tablet Take 40 mg by mouth At Bedtime       traZODone (DESYREL) 50 MG tablet Take 50 mg by mouth nightly as needed for sleep       Medications reviewed:  Medications reconciled to facility chart and changes were made to reflect current medications as identified as above med list. Below are the changes that were made:   Medications stopped since last EPIC medication reconciliation:   There are no discontinued medications.    Medications started since last [facility] medication reconciliation:  No orders of the defined types were placed in this encounter.    REVIEW OF SYSTEMS:  4 point ROS including Respiratory, CV, GI and , other than that noted in the HPI,  is negative    Physical Exam:  /68  Pulse 89  Temp 94.5  F (34.7  C)  Resp 21  Ht 5' 11\" (1.803 m)  Wt 168 lb 14.4 oz (76.6 kg)  SpO2 93%  BMI 23.56 kg/m2  BP Readin/65, 110/74, 109/77              HR:    GENERAL APPEARANCE:  Alert, in no distress, oriented, thin, somnolent, cooperative, middle-aged man eating lunch in dining room  ENT:  Mouth and " posterior oropharynx normal, moist mucous membranes, normal hearing acuity, poor dentition  EYES:  EOM, conjunctivae, lids, pupils and irises normal, wears glasses  NECK:  No adenopathy,masses or thyromegaly, trach scar present  RESP:  respiratory effort and palpation of chest normal, lungs clear to auscultation, no respiratory distress  CV:  Palpation and auscultation of heart done, regular rate and rhythm, no murmur, rub, or gallop, no edema, +2 pedal pulses  ABDOMEN:  normal bowel sounds, soft, nontender, no hepatosplenomegaly or other masses  M/S:   Gait and station normal; Digits and nails normal  SKIN:  Inspection of skin and subcutaneous tissue baseline, Palpation of skin and subcutaneous tissue baseline  NEURO:   Cranial nerves 2-12 are normal tested and grossly at patient's baseline  PSYCH:  oriented X 3, normal insight, judgement and memory, affect and mood normal, flat, withdrawn     Recent Labs:   CBC RESULTS:   Recent Labs   Lab Test 03/16/18   WBC  7.9   RBC  3.80*   HGB  12.0*   HCT  37.4*   MCV  98.4   RDW  13.8   PLT  327       Last Basic Metabolic Panel:  Recent Labs   Lab Test 03/16/18   NA  140   POTASSIUM  4.3   CHLORIDE  108   BLUE  9.7   CO2  27   BUN  16   CR  0.87   GLC  88           Assessment/Plan:   Diagnosis Comments   1. SAH (subarachnoid hemorrhage) (H)   Status post coil embolization of cerebral aneurysm  Patient followed closely by Neurology  Working with therapies - adv per their recommendations  Stable at this time  F/u with Neurology per their recommendations   2. Hydrocephalus    S/P ventriculoperitoneal shunt     Stable  VSS   3. Respiratory failure following trauma and surgery (H)    Status post tracheostomy (H)    HAP (hospital-acquired pneumonia)     No respiratory concerns at this time  Resolved   4. Oropharyngeal dysphagia Admitted on a regular diet  Doesn't appear to be having ongoing difficulties with dysphagia at this time   5. Malnutrition, unspecified type (H)  Dietary  involvement  Continue to monitor patient's weight  Adapt as needed   6. Chronic deep vein thrombosis (DVT) of distal vein of right lower extremity (H)    Internal jugular (IJ) vein thromboembolism, chronic, right (H)     No anticoagulation as noted above  Continue to monitor for s/sx of recurrence  Stable without medical intervention   7. Homeless  Working with sons/SW on placement at this time   8. Neuropathy  Managed on regimen of Gabapentin with good relief  Stable on current regimen; continue medications as currently ordered.   9. Insomnia due to medical condition  Adjustment disorder with depressed mood Declines in-house psych involvement at this time; did reassure their availability to him as he wishes  Was admitted on regimen of Lexapro and Remeron - he has declined these medications since admission to facility; please monitor for ongoing/increased sx of depression with cessation of this medication  Continues on regimen of Trazodone and Melatonin   Stable on current regimen; continue medications as currently ordered.   10. Hyperlipidemia, unspecified hyperlipidemia type  Chronic  Stable on current regimen; continue medications as currently ordered.  Continue Pravachol as ordered   11. Slow transit constipation Stable on current regimen; continue medications as currently ordered.   12. Pain  Stable on current regimen; continue medications as currently ordered.   13. Physical deconditioning  PT/OT - adv per their recommendations       Electronically signed by  SHANICE Markham CNP

## 2018-03-27 PROBLEM — Z93.0 STATUS POST TRACHEOSTOMY (H): Status: ACTIVE | Noted: 2017-12-29

## 2018-03-27 PROBLEM — I82.5Z1 CHRONIC DEEP VEIN THROMBOSIS (DVT) OF DISTAL VEIN OF RIGHT LOWER EXTREMITY (H): Status: ACTIVE | Noted: 2018-03-14

## 2018-03-27 PROBLEM — Z93.0 TRACHEOSTOMY IN PLACE (H): Status: RESOLVED | Noted: 2018-03-14 | Resolved: 2018-03-27

## 2018-03-27 PROBLEM — I82.C21: Status: ACTIVE | Noted: 2018-03-27

## 2018-03-27 PROBLEM — Z98.2 S/P VENTRICULOPERITONEAL SHUNT: Status: ACTIVE | Noted: 2018-01-05

## 2018-03-27 PROBLEM — G91.9 HYDROCEPHALUS (H): Status: ACTIVE | Noted: 2017-12-10

## 2018-03-27 PROBLEM — Z98.890 STATUS POST COIL EMBOLIZATION OF CEREBRAL ANEURYSM: Status: ACTIVE | Noted: 2017-12-13

## 2018-03-27 PROBLEM — I60.9 SAH (SUBARACHNOID HEMORRHAGE) (H): Status: ACTIVE | Noted: 2018-01-26

## 2018-03-27 PROBLEM — J96.01 ACUTE RESPIRATORY FAILURE WITH HYPOXIA (H): Status: RESOLVED | Noted: 2018-03-14 | Resolved: 2018-03-27

## 2018-03-29 ENCOUNTER — NURSING HOME VISIT (OUTPATIENT)
Dept: GERIATRICS | Facility: CLINIC | Age: 58
End: 2018-03-29
Payer: MEDICAID

## 2018-03-29 VITALS
RESPIRATION RATE: 22 BRPM | BODY MASS INDEX: 23.52 KG/M2 | HEART RATE: 91 BPM | HEIGHT: 71 IN | OXYGEN SATURATION: 90 % | WEIGHT: 168 LBS | SYSTOLIC BLOOD PRESSURE: 123 MMHG | DIASTOLIC BLOOD PRESSURE: 70 MMHG | TEMPERATURE: 98.7 F

## 2018-03-29 DIAGNOSIS — I82.C21 INTERNAL JUGULAR (IJ) VEIN THROMBOEMBOLISM, CHRONIC, RIGHT (H): ICD-10-CM

## 2018-03-29 DIAGNOSIS — R53.81 PHYSICAL DECONDITIONING: ICD-10-CM

## 2018-03-29 DIAGNOSIS — G47.01 INSOMNIA DUE TO MEDICAL CONDITION: ICD-10-CM

## 2018-03-29 DIAGNOSIS — F43.21 ADJUSTMENT DISORDER WITH DEPRESSED MOOD: ICD-10-CM

## 2018-03-29 DIAGNOSIS — G62.9 NEUROPATHY: ICD-10-CM

## 2018-03-29 DIAGNOSIS — R52 PAIN: ICD-10-CM

## 2018-03-29 DIAGNOSIS — I82.5Z1 CHRONIC DEEP VEIN THROMBOSIS (DVT) OF DISTAL VEIN OF RIGHT LOWER EXTREMITY (H): ICD-10-CM

## 2018-03-29 DIAGNOSIS — E46 MALNUTRITION, UNSPECIFIED TYPE (H): ICD-10-CM
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
## 2018-03-29 DIAGNOSIS — Z98.2 S/P VENTRICULOPERITONEAL SHUNT: ICD-10-CM

## 2018-03-29 DIAGNOSIS — J95.821 RESPIRATORY FAILURE FOLLOWING TRAUMA AND SURGERY (H): ICD-10-CM

## 2018-03-29 DIAGNOSIS — J18.9 HAP (HOSPITAL-ACQUIRED PNEUMONIA): ICD-10-CM

## 2018-03-29 DIAGNOSIS — K59.01 SLOW TRANSIT CONSTIPATION: ICD-10-CM

## 2018-03-29 DIAGNOSIS — G91.9 HYDROCEPHALUS (H): ICD-10-CM

## 2018-03-29 DIAGNOSIS — Z93.0 STATUS POST TRACHEOSTOMY (H): ICD-10-CM

## 2018-03-29 DIAGNOSIS — Z59.00 HOMELESS: ICD-10-CM

## 2018-03-29 DIAGNOSIS — Z98.890 STATUS POST COIL EMBOLIZATION OF CEREBRAL ANEURYSM: ICD-10-CM

## 2018-03-29 DIAGNOSIS — I60.9 SAH (SUBARACHNOID HEMORRHAGE) (H): Primary | ICD-10-CM

## 2018-03-29 DIAGNOSIS — E78.5 HYPERLIPIDEMIA, UNSPECIFIED HYPERLIPIDEMIA TYPE: ICD-10-CM

## 2018-03-29 DIAGNOSIS — R13.12 OROPHARYNGEAL DYSPHAGIA: ICD-10-CM

## 2018-03-29 PROCEDURE — 99309 SBSQ NF CARE MODERATE MDM 30: CPT | Performed by: NURSE PRACTITIONER

## 2018-03-29 SDOH — ECONOMIC STABILITY - HOUSING INSECURITY: HOMELESSNESS UNSPECIFIED: Z59.00

## 2018-03-29 NOTE — PROGRESS NOTES
[facility]    Chief Complaint   Patient presents with     RECHECK       HPI:    Cornelius Wolfe is a 57 year old  (1960), who is being seen today for an episodic care visit at [facility].  HPI information obtained from: facility chart records, facility staff and patient report.    Today's concern is:  SAH (subarachnoid hemorrhage) (H)  Status post coil embolization of cerebral aneurysm      Patient independent at this point in most ADLs requiring a less intense level of therapies prior to d/c back into the community  Continues with neuropathic pain to hands as noted below  PT/OT Discharged patient on 3/23:    PT: Discharge summary: patient has been seen for 8 skilled PT treatment sessions. Demonstrates the ability to ambulate independently throughout facility without AD. Performs transfers and bed mobility independently.  Patient has been provided with a home exercise program to prevent decline in functional mobility following D/C from PT. Patient  to remainin LTC facility [facility]. Nofurther skilled therapeutic intervention is indicated at this time.   OT Discharged patient on 3/23:    Functional activity:     CPT shop task 5/6.     Pt successful to complete 10/12 5 and 6 step sequencing tasks independently.    CPT MED BOX- 5.0/6.0    SAFETY-  17 / 18    Functional activity:     Short Blessed score 14/28 suggests significant cognitive impairment, difficulty noted with short term memory and orientation. Discussed compensatory strategies for STM i.e. reminders in phone, help from friends and family. OT administered the SLUMS to pt.  Pt. scored 20/30.  This score suggests dementia cognition.    Pt. scored 22/30 on the MMSE-2.  This score suggests Mild cognitive impairment.    3/23/2018- Last day skilled OT with goals met,      Hydrocephalus  S/P ventriculoperitoneal shunt  Denies headaches or increased difficulties with balance; no noted confusion - baseline  "forgetfulness  Baseline cognitive impairment as noted above    Respiratory failure following trauma and surgery (H)  Status post tracheostomy (H)  HAP (hospital-acquired pneumonia)  Denies cough/SOB/TORRES today  Trach scar healed well w/o difficulty    Oropharyngeal dysphagia    Patient tolerating regular diet without difficulty    Malnutrition, unspecified type (H)  Admit weight 166lbs  Diet as noted above   Does receive house supplement BID  Dietary following  Wt Readings from Last 3 Encounters:   03/29/18 168 lb (76.2 kg)   03/22/18 168 lb 14.4 oz (76.6 kg)   03/18/18 167 lb 1.6 oz (75.8 kg)     BMI WNL: Estimated body mass index is 23.43 kg/(m^2) as calculated from the following:    Height as of this encounter: 5' 11\" (1.803 m).    Weight as of this encounter: 168 lb (76.2 kg).    Chronic deep vein thrombosis (DVT) of distal vein of right lower extremity (H)  Internal jugular (IJ) vein thromboembolism, chronic, right (H)    No current anticoagulation 2/2 hemorrhage as noted above    Homeless  2/2 to extended hospitalization  He states he did have his own apartment prior to this event, but he no longer has this.  He notes that he has been working with his sons at locating a place for him to move following his stay in the facility - He is educated on the assistance of SW and relocation that is available to assist him    Neuropathy  2/2 to subarachnoid hemorrhage as noted above  Baseline in hands and increases with activity as noted with therapy notes -  He states that it's \"okay\" today and continues to decline the need for adjustment - he does note that the pain doesn't effect his ability to complete daily activities  Currently on regimen of Gabapentin and PRN Tylenol #3    Insomnia due to medical condition  Adjustment disorder with depressed mood  Admitted on regimen of Lexapro, Remeron, Trazodone and Melatonin  He declined both Lexapro and Remeron upon arrival to the facility, he notes that he is stressed and " slightly depressed at times but feels as though he is able to cope with these things at this time without these medications on board  Did speak with him about depression being completely normal following his extensive health concerns since December and assured him that we would be here to work with him through these things and that we also have in-house psychology/psychiatry available to him as he wishes  Does tend to stick to himself, 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 - staff to encourage participation in facility activities  Will continue Remeron and Melatonin at bed to help with sleeping    Hyperlipidemia, unspecified hyperlipidemia type  Chronic  Recent Labs   Lab Test 03/16/18   CHOL  124   HDL  48   LDL  56   TRIG  99   On regimen of Pravachol     Slow transit constipation  Patient denies difficulties with constipation today  On regimen of Miralax and Nutrisource fiber    Pain  2/2 above noted diagnoses and hosptializations  Managed on Tylenol #3 and Gabapentin as noted above    Physical deconditioning  2/2 above noted diagnoses and hospitalizations  Discharged from therapies as noted above    ALLERGIES: No known allergies  Past Medical, Surgical, Family and Social History reviewed and updated in [facility].    Current Outpatient Prescriptions   Medication Sig Dispense Refill     acetaminophen-codeine (TYLENOL #3) 300-30 MG per tablet Take 1-2 tablets by mouth every 4 hours as needed for moderate pain       gabapentin (NEURONTIN) 100 MG capsule Take 100 mg by mouth 2 times daily       melatonin 3 MG tablet Take 3 mg by mouth At Bedtime       polyethylene glycol (MIRALAX/GLYCOLAX) powder Take 17 g by mouth 2 times daily as needed for constipation       fiber modular (NUTRISOURCE FIBER) packet 1 packet 2 times daily as needed       pravastatin (PRAVACHOL) 20 MG tablet Take 40 mg by mouth At Bedtime       traZODone (DESYREL) 50 MG tablet Take 50 mg by mouth  "nightly as needed for sleep       Medications reviewed:  Medications reconciled to facility chart and changes were made to reflect current medications as identified as above med list. Below are the changes that were made:   Medications stopped since last EPIC medication reconciliation:   There are no discontinued medications.    Medications started since last [facility] medication reconciliation:  No orders of the defined types were placed in this encounter.    REVIEW OF SYSTEMS:  4 point ROS including Respiratory, CV, GI and , other than that noted in the HPI,  is negative    Physical Exam:  /70  Pulse 91  Temp 98.7  F (37.1  C)  Resp 22  Ht 5' 11\" (1.803 m)  Wt 168 lb (76.2 kg)  SpO2 90%  BMI 23.43 kg/m2  BP Readin/81, 113/68, 120/65          HR:  72-94  GENERAL APPEARANCE:  Alert, in no distress, oriented, thin, somnolent, cooperative, middle-aged man sitting on the edge of his bed  ENT:  Mouth and posterior oropharynx normal, moist mucous membranes, normal hearing acuity, poor dentition  EYES:  EOM, conjunctivae, lids, pupils and irises normal, wears glasses  NECK:  No adenopathy,masses or thyromegaly, trach scar present  RESP:  respiratory effort and palpation of chest normal, lungs clear to auscultation, no respiratory distress  CV:  Palpation and auscultation of heart done, regular rate and rhythm, no murmur, rub, or gallop, no edema, +2 pedal pulses  ABDOMEN:  normal bowel sounds, soft, nontender, no hepatosplenomegaly or other masses  M/S:   Gait and station normal; Digits and nails normal  SKIN:  Inspection of skin and subcutaneous tissue baseline, Palpation of skin and subcutaneous tissue baseline  NEURO:   Cranial nerves 2-12 are normal tested and grossly at patient's baseline  PSYCH:  oriented X 3, normal insight, judgement and memory, affect and mood normal, flat, withdrawn     Recent Labs:   CBC RESULTS:   Recent Labs   Lab Test 18   WBC  7.9   RBC  3.80*   HGB  12.0*   HCT  " 37.4*   MCV  98.4   RDW  13.8   PLT  327       Last Basic Metabolic Panel:  Recent Labs   Lab Test 03/16/18   NA  140   POTASSIUM  4.3   CHLORIDE  108   BLUE  9.7   CO2  27   BUN  16   CR  0.87   GLC  88         Assessment/Plan:   Diagnosis Comments   1. SAH (subarachnoid hemorrhage) (H)   Status post coil embolization of cerebral aneurysm  Patient followed closely by Neurology  Discharged from therapies for attaining patient baseline  Stable at this time  F/u with Neurology per their recommendations   2. Hydrocephalus    S/P ventriculoperitoneal shunt     Stable  VSS   3. Respiratory failure following trauma and surgery (H)    Status post tracheostomy (H)    HAP (hospital-acquired pneumonia)     No respiratory concerns at this time  Resolved   4. Oropharyngeal dysphagia Admitted on a regular diet  Doesn't appear to be having ongoing difficulties with dysphagia at this time   5. Malnutrition, unspecified type (H)  Dietary involvement  Continue to monitor patient's weight  Adapt as needed  Patient's BMI WNL at this time  Resolved   6. Chronic deep vein thrombosis (DVT) of distal vein of right lower extremity (H)    Internal jugular (IJ) vein thromboembolism, chronic, right (H)     No anticoagulation as noted above  Continue to monitor for s/sx of recurrence  Stable without medical intervention   7. Homeless  Working with sons/SW on placement at this time   8. Neuropathy  Managed on regimen of Gabapentin with good relief  Stable on current regimen; continue medications as currently ordered.   9. Insomnia due to medical condition  Adjustment disorder with depressed mood Declines in-house psych involvement at this time; did reassure their availability to him as he wishes  Was admitted on regimen of Lexapro and Remeron - he has declined these medications since admission to facility; please monitor for ongoing/increased sx of depression with cessation of this medication  Continues on regimen of Trazodone and Melatonin    Stable on current regimen; continue medications as currently ordered.   10. Hyperlipidemia, unspecified hyperlipidemia type  Chronic  Stable on current regimen; continue medications as currently ordered.  Continue Pravachol as ordered   11. Slow transit constipation Stable on current regimen; continue medications as currently ordered.   12. Pain  Stable on current regimen; continue medications as currently ordered.   13. Physical deconditioning  PT/OT completed  Will remain in facility while placement is located       Electronically signed by  SHANICE Markham CNP

## 2018-03-29 NOTE — LETTER
3/29/2018        RE: Cornelius Wolfe  [address] [address] [place] [id number]        [facility]    Chief Complaint   Patient presents with     RECHECK       HPI:    Cornelius Wolfe is a 57 year old  (1960), who is being seen today for an [facility]odic [facility].  HPI information obtained from: facility chart records, facility staff and patient report.    Today's concern is:  SAH (subarachnoid hemorrhage) (H)  Status post coil embolization of cerebral aneurysm      Patient independent at this point in most ADLs requiring a less intense level of therapies prior to d/c back into the community  Continues with neuropathic pain to hands as noted below  PT/OT Discharged patient on 3/23:    PT: Discharge summary: patient has been seen for 8 skilled PT treatment sessions. Demonstrates the ability to ambulate independently throughout facility without AD. Performs transfers and bed mobility independently.  Patient has been provided with a home exercise program to prevent decline in functional mobility following D/C from PT. Patient  to remainin LTC facility pending placement in nursing home. Nofurther skilled therapeutic intervention is indicated at this time.   OT Discharged patient on 3/23:    Functional activity:     CPT shop task 5/6.     Pt successful to complete 10/12 5 and 6 step sequencing tasks independently.    CPT MED BOX- 5.0/6.0    SAFETY-  17 / 18    Functional activity:     Short Blessed score 14/28 suggests significant cognitive impairment, difficulty noted with short term memory and orientation. Discussed compensatory strategies for STM i.e. reminders in phone, help from friends and family. OT administered the SLUMS to pt.  Pt. scored 20/30.  This score suggests dementia cognition.    Pt. scored 22/30 on the MMSE-2.  This score suggests Mild cognitive impairment.    3/23/2018- Last day skilled OT with goals met,      Hydrocephalus  S/P ventriculoperitoneal shunt  Denies  "headaches or increased difficulties with balance; no noted confusion - baseline forgetfulness  Baseline cognitive impairment as noted above    Respiratory failure following trauma and surgery (H)  Status post tracheostomy (H)  HAP (hospital-acquired pneumonia)  Denies cough/SOB/TORRES today  Trach scar healed well w/o difficulty    Oropharyngeal dysphagia    Patient tolerating regular diet without difficulty    Malnutrition, unspecified type (H)  Admit weight 166lbs  Diet as noted above   Does receive house supplement BID  Dietary following  Wt Readings from Last 3 Encounters:   03/29/18 168 lb (76.2 kg)   03/22/18 168 lb 14.4 oz (76.6 kg)   03/18/18 167 lb 1.6 oz (75.8 kg)     BMI WNL: Estimated body mass index is 23.43 kg/(m^2) as calculated from the following:    Height as of this encounter: 5' 11\" (1.803 m).    Weight as of this encounter: 168 lb (76.2 kg).    Chronic deep vein thrombosis (DVT) of distal vein of right lower extremity (H)  Internal jugular (IJ) vein thromboembolism, chronic, right (H)    No current anticoagulation 2/2 hemorrhage as noted above    Homeless  2/2 to extended hospitalization  He states he did have his own apartment prior to this event, but he no longer has this.  He notes that he has been working with his sons at locating a place for him to move following his stay in the facility - He is educated on the assistance of SW and relocation that is available to assist him    Neuropathy  2/2 to subarachnoid hemorrhage as noted above  Baseline in hands and increases with activity as noted with therapy notes -  He states that it's \"okay\" today and continues to decline the need for adjustment - he does note that the pain doesn't effect his ability to complete daily activities  Currently on regimen of Gabapentin and PRN Tylenol #3    Insomnia due to medical condition  Adjustment disorder with depressed mood  Admitted on regimen of Lexapro, Remeron, Trazodone and Melatonin  He declined both Lexapro " and Remeron upon arrival to the facility, he notes that he is stressed and slightly depressed at times but feels as though he is able to cope with these things at this time without these medications on board  Did speak with him about depression being completely normal following his extensive health concerns since December and assured him that we would be here to work with him through these things and that we also have in-house psychology/psychiatry available to him as he wishes  Does tend to stick to himself, 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 - staff to encourage participation in facility activities  Will continue Remeron and Melatonin at bed to help with sleeping    Hyperlipidemia, unspecified hyperlipidemia type  Chronic  Recent Labs   Lab Test 03/16/18   CHOL  124   HDL  48   LDL  56   TRIG  99   On regimen of Pravachol     Slow transit constipation  Patient denies difficulties with constipation today  On regimen of Miralax and Nutrisource fiber    Pain  2/2 above noted diagnoses and hosptializations  Managed on Tylenol #3 and Gabapentin as noted above    Physical deconditioning  2/2 above noted diagnoses and hospitalizations  Discharged from therapies as noted above    ALLERGIES: No known allergies  Past Medical, Surgical, Family and Social History reviewed and updated in [employer].    Current Outpatient Prescriptions   Medication Sig Dispense Refill     acetaminophen-codeine (TYLENOL #3) 300-30 MG per tablet Take 1-2 tablets by mouth every 4 hours as needed for moderate pain       gabapentin (NEURONTIN) 100 MG capsule Take 100 mg by mouth 2 times daily       melatonin 3 MG tablet Take 3 mg by mouth At Bedtime       polyethylene glycol (MIRALAX/GLYCOLAX) powder Take 17 g by mouth 2 times daily as needed for constipation       fiber modular (NUTRISOURCE FIBER) packet 1 packet 2 times daily as needed       pravastatin (PRAVACHOL) 20 MG tablet Take 40 mg by mouth  "At Bedtime       traZODone (DESYREL) 50 MG tablet Take 50 mg by mouth nightly as needed for sleep       Medications reviewed:  Medications reconciled to facility chart and changes were made to reflect current medications as identified as above med list. Below are the changes that were made:   Medications stopped since last EPIC medication reconciliation:   There are no discontinued medications.    Medications started since last [facility] medication reconciliation:  No orders of the defined types were placed in this encounter.    REVIEW OF SYSTEMS:  4 point ROS including Respiratory, CV, GI and , other than that noted in the HPI,  is negative    Physical Exam:  /70  Pulse 91  Temp 98.7  F (37.1  C)  Resp 22  Ht 5' 11\" (1.803 m)  Wt 168 lb (76.2 kg)  SpO2 90%  BMI 23.43 kg/m2  BP Readin/81, 113/68, 120/65          HR:  72-94  GENERAL APPEARANCE:  Alert, in no distress, oriented, thin, somnolent, cooperative, middle-aged man sitting on the edge of his bed  ENT:  Mouth and posterior oropharynx normal, moist mucous membranes, normal hearing acuity, poor dentition  EYES:  EOM, conjunctivae, lids, pupils and irises normal, wears glasses  NECK:  No adenopathy,masses or thyromegaly, trach scar present  RESP:  respiratory effort and palpation of chest normal, lungs clear to auscultation, no respiratory distress  CV:  Palpation and auscultation of heart done, regular rate and rhythm, no murmur, rub, or gallop, no edema, +2 pedal pulses  ABDOMEN:  normal bowel sounds, soft, nontender, no hepatosplenomegaly or other masses  M/S:   Gait and station normal; Digits and nails normal  SKIN:  Inspection of skin and subcutaneous tissue baseline, Palpation of skin and subcutaneous tissue baseline  NEURO:   Cranial nerves 2-12 are normal tested and grossly at patient's baseline  PSYCH:  oriented X 3, normal insight, judgement and memory, affect and mood normal, flat, withdrawn     Recent Labs:   CBC RESULTS:   Recent " Labs   Lab Test 03/16/18   WBC  7.9   RBC  3.80*   HGB  12.0*   HCT  37.4*   MCV  98.4   RDW  13.8   PLT  327       Last Basic Metabolic Panel:  Recent Labs   Lab Test 03/16/18   NA  140   POTASSIUM  4.3   CHLORIDE  108   BLUE  9.7   CO2  27   BUN  16   CR  0.87   GLC  88         Assessment/Plan:   Diagnosis Comments   1. SAH (subarachnoid hemorrhage) (H)   Status post coil embolization of cerebral aneurysm  Patient followed closely by Neurology  Discharged from therapies for attaining patient baseline  Stable at this time  F/u with Neurology per their recommendations   2. Hydrocephalus    S/P ventriculoperitoneal shunt     Stable  VSS   3. Respiratory failure following trauma and surgery (H)    Status post tracheostomy (H)    HAP (hospital-acquired pneumonia)     No respiratory concerns at this time  Resolved   4. Oropharyngeal dysphagia Admitted on a regular diet  Doesn't appear to be having ongoing difficulties with dysphagia at this time   5. Malnutrition, unspecified type (H)  Dietary involvement  Continue to monitor patient's weight  Adapt as needed  Patient's BMI WNL at this time  Resolved   6. Chronic deep vein thrombosis (DVT) of distal vein of right lower extremity (H)    Internal jugular (IJ) vein thromboembolism, chronic, right (H)     No anticoagulation as noted above  Continue to monitor for s/sx of recurrence  Stable without medical intervention   7. Homeless  Working with sons/SW on placement at this time   8. Neuropathy  Managed on regimen of Gabapentin with good relief  Stable on current regimen; continue medications as currently ordered.   9. Insomnia due to medical condition  Adjustment disorder with depressed mood Declines in-house psych involvement at this time; did reassure their availability to him as he wishes  Was admitted on regimen of Lexapro and Remeron - he has declined these medications since admission to facility; please monitor for ongoing/increased sx of depression with cessation of  this medication  Continues on regimen of Trazodone and Melatonin   Stable on current regimen; continue medications as currently ordered.   10. Hyperlipidemia, unspecified hyperlipidemia type  Chronic  Stable on current regimen; continue medications as currently ordered.  Continue Pravachol as ordered   11. Slow transit constipation Stable on current regimen; continue medications as currently ordered.   12. Pain  Stable on current regimen; continue medications as currently ordered.   13. Physical deconditioning  PT/OT completed  Will remain in facility while placement is located       Electronically signed by  SHANICE Markham CNP                      Sincerely,        SHANICE Markham CNP

## 2018-04-11 VITALS
HEART RATE: 79 BPM | BODY MASS INDEX: 23.97 KG/M2 | RESPIRATION RATE: 20 BRPM | SYSTOLIC BLOOD PRESSURE: 109 MMHG | TEMPERATURE: 97.2 F | HEIGHT: 71 IN | DIASTOLIC BLOOD PRESSURE: 71 MMHG | OXYGEN SATURATION: 90 % | WEIGHT: 171.2 LBS

## 2018-04-11 PROBLEM — K59.01 SLOW TRANSIT CONSTIPATION: Status: ACTIVE | Noted: 2018-04-11

## 2018-04-11 PROBLEM — Z59.00 HOMELESS: Status: ACTIVE | Noted: 2018-04-11

## 2018-04-11 PROBLEM — R52 PAIN: Status: ACTIVE | Noted: 2018-04-11

## 2018-04-11 PROBLEM — G47.01 INSOMNIA DUE TO MEDICAL CONDITION: Status: ACTIVE | Noted: 2018-04-11

## 2018-04-11 PROBLEM — F43.21 ADJUSTMENT DISORDER WITH DEPRESSED MOOD: Status: ACTIVE | Noted: 2018-04-11

## 2018-04-11 PROBLEM — E78.5 HYPERLIPIDEMIA, UNSPECIFIED HYPERLIPIDEMIA TYPE: Status: ACTIVE | Noted: 2018-04-11

## 2018-04-11 PROBLEM — R53.81 PHYSICAL DECONDITIONING: Status: ACTIVE | Noted: 2018-04-11

## 2018-04-11 PROBLEM — G62.9 NEUROPATHY: Status: ACTIVE | Noted: 2018-04-11

## 2018-04-11 NOTE — PROGRESS NOTES
[facility]    Chief Complaint   Patient presents with     RECHECK       HPI:    Cornelius Wolfe is a 57 year old  (1960), who is being seen today [facility] [facility].  HPI information obtained from: facility chart records, facility staff and patient report.    Today's concern is:  SAH (subarachnoid hemorrhage) (H)  Status post coil embolization of cerebral aneurysm      Patient independent at this point in most ADLs requiring a less intense level of therapies prior to d/c back into the community  Continues with neuropathic pain to hands as noted below  PT/OT Discharged patient on 3/23:    PT: Discharge summary: patient has been seen for 8 skilled PT treatment sessions. Demonstrates the ability to ambulate independently throughout facility without AD. Performs transfers and bed mobility independently.  Patient has been provided with a home exercise program to prevent decline in functional mobility following D/C from PT. Patient  to remainin LTC facility pending placement in custodial. Nofurther skilled therapeutic intervention is indicated at this time.   OT Discharged patient on 3/23:    Functional activity:     CPT shop task 5/6.     Pt successful to complete 10/12 5 and 6 step sequencing tasks independently.    CPT MED BOX- 5.0/6.0    SAFETY-  17 / 18    Functional activity:     Short Blessed score 14/28 suggests significant cognitive impairment, difficulty noted with short term memory and orientation. Discussed compensatory strategies for STM i.e. reminders in phone, help from friends and family. OT administered the SLUMS to pt.  Pt. scored 20/30.  This score suggests dementia cognition.    Pt. scored 22/30 on the MMSE-2.  This score suggests Mild cognitive impairment.    3/23/2018- Last day skilled OT with goals met,      Hydrocephalus  S/P ventriculoperitoneal shunt  Denies headaches or increased difficulties with balance; no noted confusion - baseline  "forgetfulness  Baseline cognitive impairment as noted above    Respiratory failure following trauma and surgery (H)  Status post tracheostomy (H)  HAP (hospital-acquired pneumonia)  Denies cough/SOB/TORRES today  Trach scar healed well w/o difficulty    Oropharyngeal dysphagia    Patient tolerating regular diet without difficulty    Malnutrition, unspecified type (H)  Admit weight 166lbs  Patient continues to regain weight slowly  Diet as noted above   Does receive house supplement BID  Dietary following  Wt Readings from Last 3 Encounters:   04/11/18 171 lb 3.2 oz (77.7 kg)   03/29/18 168 lb (76.2 kg)   03/22/18 168 lb 14.4 oz (76.6 kg)     Estimated body mass index is 23.88 kg/(m^2) as calculated from the following:    Height as of this encounter: 5' 11\" (1.803 m).    Weight as of this encounter: 171 lb 3.2 oz (77.7 kg).    Chronic deep vein thrombosis (DVT) of distal vein of right lower extremity (H)  Internal jugular (IJ) vein thromboembolism, chronic, right (H)    No current anticoagulation 2/2 hemorrhage as noted above    Homeless  2/2 to extended hospitalization  He states he did have his own apartment prior to this event, but he no longer has this.  He notes that he has been working with his sons at locating a place for him to move following his stay in the facility - He is also working with facility SW    Neuropathy  2/2 to subarachnoid hemorrhage as noted above  Baseline in hands and increases with activity as noted with therapy notes -  He states that it's \"okay\" today and continues to decline the need for adjustment - he does note that the pain doesn't effect his ability to complete daily activities  Currently on regimen of Gabapentin and PRN Tylenol #3    Insomnia due to medical condition  Adjustment disorder with depressed mood  Admitted on regimen of Lexapro, Remeron, Trazodone and Melatonin  He declined both Lexapro and Remeron upon arrival to the facility, he notes that he is stressed and slightly " depressed at times but feels as though he is able to cope with these things at this time without these medications on board  Did speak with him about depression being completely normal following his extensive health concerns since December and assured him that we would be here to work with him through these things and that we also have in-house psychology/psychiatry available to him as he wishes  Does tend to stick to himself, 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 - staff to encourage participation in facility activities  Last PHQ9 (3/20/18): 01/27  Patient not using PRN Trazodone - D/c'd 2/2 non-use  Continues on regimen of Melatonin 3mg qHS    Hyperlipidemia, unspecified hyperlipidemia type  Chronic  Recent Labs   Lab Test 03/16/18   CHOL  124   HDL  48   LDL  56   TRIG  99   On regimen of Pravachol     Slow transit constipation  Patient denies difficulties with constipation today  On regimen of Miralax and Nutrisource fiber    Pain  2/2 above noted diagnoses and hosptializations  Managed on Tylenol #3 and Gabapentin as noted above  Has used Tylenol #3 a total of 2 times in the past 4 days    Physical deconditioning  2/2 above noted diagnoses and hospitalizations  Discharged from therapies as noted above  2/2 above noted diagnoses and hospitalizations    ALLERGIES: No known allergies  Past Medical, Surgical, Family and Social History reviewed and updated in [employer].    Current Outpatient Prescriptions   Medication Sig Dispense Refill     acetaminophen-codeine (TYLENOL #3) 300-30 MG per tablet Take 1-2 tablets by mouth every 4 hours as needed for moderate pain       gabapentin (NEURONTIN) 100 MG capsule Take 100 mg by mouth 2 times daily       melatonin 3 MG tablet Take 3 mg by mouth At Bedtime       polyethylene glycol (MIRALAX/GLYCOLAX) powder Take 17 g by mouth 2 times daily as needed for constipation       fiber modular (NUTRISOURCE FIBER) packet 1 packet 2 times  "daily as needed       pravastatin (PRAVACHOL) 20 MG tablet Take 40 mg by mouth At Bedtime       Medications reviewed:  Medications reconciled to facility chart and changes were made to reflect current medications as identified as above med list. Below are the changes that were made:   Medications stopped since last EPIC medication reconciliation:   Medications Discontinued During This Encounter   Medication Reason     traZODone (DESYREL) 50 MG tablet Discontinued by another Health Care Provider       Medications started since last [facility] medication reconciliation:  No orders of the defined types were placed in this encounter.    REVIEW OF SYSTEMS:  4 point ROS including Respiratory, CV, GI and , other than that noted in the HPI,  is negative    Physical Exam:  /71  Pulse 79  Temp 97.2  F (36.2  C)  Resp 20  Ht 5' 11\" (1.803 m)  Wt 171 lb 3.2 oz (77.7 kg)  SpO2 90%  BMI 23.88 kg/m2  BP Readin/71, 123/70, 148/81                  HR:  72-91  GENERAL APPEARANCE:  Alert, in no distress, oriented, thin, somnolent, cooperative, middle-aged man sitting on the edge of his bed  ENT:  Mouth and posterior oropharynx normal, moist mucous membranes, normal hearing acuity, poor dentition  EYES:  EOM, conjunctivae, lids, pupils and irises normal, wears glasses  NECK:  No adenopathy, masses or thyromegaly, trach scar present  RESP:  Respiratory effort and palpation of chest normal, lungs clear to auscultation, no respiratory distress  CV:  Palpation and auscultation of heart done, regular rate and rhythm, no murmur, rub, or gallop, no edema, +2 pedal pulses  ABDOMEN:  normal bowel sounds, soft, nontender, no hepatosplenomegaly or other masses  M/S:   Gait and station normal; Digits and nails normal  SKIN:  Inspection of skin and subcutaneous tissue baseline, Palpation of skin and subcutaneous tissue baseline  NEURO:   Cranial nerves 2-12 are normal tested and grossly at patient's baseline  PSYCH:  oriented X 3, " normal insight, judgement and memory, affect and mood normal, flat, withdrawn     Recent Labs:   CBC RESULTS:   Recent Labs   Lab Test 03/16/18   WBC  7.9   RBC  3.80*   HGB  12.0*   HCT  37.4*   MCV  98.4   RDW  13.8   PLT  327       Last Basic Metabolic Panel:  Recent Labs   Lab Test 03/16/18   NA  140   POTASSIUM  4.3   CHLORIDE  108   BLUE  9.7   CO2  27   BUN  16   CR  0.87   GLC  88         Assessment/Plan:   Diagnosis Comments   1. SAH (subarachnoid hemorrhage) (H)   Status post coil embolization of cerebral aneurysm  Patient followed closely by Neurology  Discharged from therapies for attaining patient baseline  Stable at this time  F/u with Neurology per their recommendations   2. Hydrocephalus    S/P ventriculoperitoneal shunt     Stable  VSS   3. Respiratory failure following trauma and surgery (H)    Status post tracheostomy (H)    HAP (hospital-acquired pneumonia)     No respiratory concerns at this time  Resolved   4. Oropharyngeal dysphagia Appears to have resolved  Tolerating Regular diet without difficulty   5. Malnutrition, unspecified type (H)  Dietary involvement  Weight slowly improving  Continue to monitor patient's weight  Adapt as needed  Patient's BMI WNL at this time  Resolved   6. Chronic deep vein thrombosis (DVT) of distal vein of right lower extremity (H)    Internal jugular (IJ) vein thromboembolism, chronic, right (H)     No anticoagulation as noted above  Continue to monitor for s/sx of recurrence  Stable without medical intervention   7. Homeless  Working with sons/SW on placement at this time   8. Neuropathy  Managed on regimen of Gabapentin with good relief  Stable on current regimen; continue medications as currently ordered.   9. Insomnia due to medical condition  Adjustment disorder with depressed mood Declines in-house psych involvement at this time; did reassure their availability to him as he wishes  Was admitted on regimen of Lexapro and Remeron - he has declined these  medications since admission to facility; please monitor for ongoing/increased sx of depression with cessation of this medication  Continues on regimen of Melatonin   Stable on current regimen; continue medications as currently ordered.   10. Hyperlipidemia, unspecified hyperlipidemia type  Chronic  Stable on current regimen; continue medications as currently ordered.  Continue Pravachol as ordered   11. Slow transit constipation Stable on current regimen; continue medications as currently ordered.   12. Pain  Stable on current regimen; continue medications as currently ordered.   13. Physical deconditioning  PT/OT completed  Will remain in facility while placement is located       Electronically signed by  SHANICE Markham CNP

## 2018-04-12 ENCOUNTER — NURSING HOME VISIT (OUTPATIENT)
Dept: GERIATRICS | Facility: CLINIC | Age: 58
End: 2018-04-12
Payer: MEDICAID

## 2018-04-12 DIAGNOSIS — K59.01 SLOW TRANSIT CONSTIPATION: ICD-10-CM

## 2018-04-12 DIAGNOSIS — I60.9 SAH (SUBARACHNOID HEMORRHAGE) (H): Primary | ICD-10-CM

## 2018-04-12 DIAGNOSIS — G47.01 INSOMNIA DUE TO MEDICAL CONDITION: ICD-10-CM

## 2018-04-12 DIAGNOSIS — E46 MALNUTRITION, UNSPECIFIED TYPE (H): ICD-10-CM

## 2018-04-12 DIAGNOSIS — I82.C21 INTERNAL JUGULAR (IJ) VEIN THROMBOEMBOLISM, CHRONIC, RIGHT (H): ICD-10-CM

## 2018-04-12 DIAGNOSIS — Z98.2 S/P VENTRICULOPERITONEAL SHUNT: ICD-10-CM

## 2018-04-12 DIAGNOSIS — J18.9 HAP (HOSPITAL-ACQUIRED PNEUMONIA): ICD-10-CM

## 2018-04-12 DIAGNOSIS — J95.821 RESPIRATORY FAILURE FOLLOWING TRAUMA AND SURGERY (H): ICD-10-CM

## 2018-04-12 DIAGNOSIS — R53.81 PHYSICAL DECONDITIONING: ICD-10-CM

## 2018-04-12 DIAGNOSIS — I82.5Z1 CHRONIC DEEP VEIN THROMBOSIS (DVT) OF DISTAL VEIN OF RIGHT LOWER EXTREMITY (H): ICD-10-CM

## 2018-04-12 DIAGNOSIS — F43.21 ADJUSTMENT DISORDER WITH DEPRESSED MOOD: ICD-10-CM

## 2018-04-12 DIAGNOSIS — Z93.0 STATUS POST TRACHEOSTOMY (H): ICD-10-CM

## 2018-04-12 DIAGNOSIS — R52 PAIN: ICD-10-CM

## 2018-04-12 DIAGNOSIS — Z59.00 HOMELESS: ICD-10-CM

## 2018-04-12 DIAGNOSIS — G62.9 NEUROPATHY: ICD-10-CM

## 2018-04-12 DIAGNOSIS — R13.12 OROPHARYNGEAL DYSPHAGIA: ICD-10-CM

## 2018-04-12 DIAGNOSIS — Z98.890 STATUS POST COIL EMBOLIZATION OF CEREBRAL ANEURYSM: ICD-10-CM

## 2018-04-12 DIAGNOSIS — E78.5 HYPERLIPIDEMIA, UNSPECIFIED HYPERLIPIDEMIA TYPE: ICD-10-CM
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
## 2018-04-12 DIAGNOSIS — G91.9 HYDROCEPHALUS (H): ICD-10-CM

## 2018-04-12 PROCEDURE — 99309 SBSQ NF CARE MODERATE MDM 30: CPT | Performed by: NURSE PRACTITIONER

## 2018-04-12 SDOH — ECONOMIC STABILITY - HOUSING INSECURITY: HOMELESSNESS UNSPECIFIED: Z59.00

## 2018-04-12 NOTE — LETTER
4/12/2018        RE: Cornelius Wolfe  [id number] [address] [place] [id number]        [facility]    Chief Complaint   Patient presents with     RECHECK       HPI:    Cornelius Wolfe is a 57 year old  (1960), who is being seen today for an episodic care visit at [facility].  HPI information obtained from: facility chart records, facility staff and patient report.    Today's concern is:  SAH (subarachnoid hemorrhage) (H)  Status post coil embolization of cerebral aneurysm      Patient independent at this point in most ADLs requiring a less intense level of therapies prior to d/c back into the community  Continues with neuropathic pain to hands as noted below  PT/OT Discharged patient on 3/23:    PT: Discharge summary: patient has been seen for 8 skilled PT treatment sessions. Demonstrates the ability to ambulate independently throughout facility without AD. Performs transfers and bed mobility independently.  Patient has been provided with a home exercise program to prevent decline in functional mobility following D/C from PT. Patient  to remainin LTC facility pending placement in [facility]. Nofurther skilled therapeutic intervention is indicated at this time.   OT Discharged patient on 3/23:    Functional activity:     CPT shop task 5/6.     Pt successful to complete 10/12 5 and 6 step sequencing tasks independently.    CPT MED BOX- 5.0/6.0    SAFETY-  17 / 18    Functional activity:     Short Blessed score 14/28 suggests significant cognitive impairment, difficulty noted with short term memory and orientation. Discussed compensatory strategies for STM i.e. reminders in phone, help from friends and family. OT administered the SLUMS to pt.  Pt. scored 20/30.  This score suggests dementia cognition.    Pt. scored 22/30 on the MMSE-2.  This score suggests Mild cognitive impairment.    3/23/2018- Last day skilled OT with goals met,      Hydrocephalus  S/P ventriculoperitoneal shunt  Denies  "headaches or increased difficulties with balance; no noted confusion - baseline forgetfulness  Baseline cognitive impairment as noted above    Respiratory failure following trauma and surgery (H)  Status post tracheostomy (H)  HAP (hospital-acquired pneumonia)  Denies cough/SOB/TORRES today  Trach scar healed well w/o difficulty    Oropharyngeal dysphagia    Patient tolerating regular diet without difficulty    Malnutrition, unspecified type (H)  Admit weight 166lbs  Patient continues to regain weight slowly  Diet as noted above   Does receive house supplement BID  Dietary following  Wt Readings from Last 3 Encounters:   04/11/18 171 lb 3.2 oz (77.7 kg)   03/29/18 168 lb (76.2 kg)   03/22/18 168 lb 14.4 oz (76.6 kg)     Estimated body mass index is 23.88 kg/(m^2) as calculated from the following:    Height as of this encounter: 5' 11\" (1.803 m).    Weight as of this encounter: 171 lb 3.2 oz (77.7 kg).    Chronic deep vein thrombosis (DVT) of distal vein of right lower extremity (H)  Internal jugular (IJ) vein thromboembolism, chronic, right (H)    No current anticoagulation 2/2 hemorrhage as noted above    Homeless  2/2 to extended hospitalization  He states he did have his own apartment prior to this event, but he no longer has this.  He notes that he has been working with his sons at locating a place for him to move following his stay in the facility - He is also working with facility SW    Neuropathy  2/2 to subarachnoid hemorrhage as noted above  Baseline in hands and increases with activity as noted with therapy notes -  He states that it's \"okay\" today and continues to decline the need for adjustment - he does note that the pain doesn't effect his ability to complete daily activities  Currently on regimen of Gabapentin and PRN Tylenol #3    Insomnia due to medical condition  Adjustment disorder with depressed mood  Admitted on regimen of Lexapro, Remeron, Trazodone and Melatonin  He declined both Lexapro and " Remeron upon arrival to the facility, he notes that he is stressed and slightly depressed at times but feels as though he is able to cope with these things at this time without these medications on board  Did speak with him about depression being completely normal following his extensive health concerns since December and assured him that we would be here to work with him through these things and that we also have in-house psychology/psychiatry available to him as he wishes  Does tend to stick to himself, 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 - staff to encourage participation in facility activities  Last PHQ9 (3/20/18): 01/27  Patient not using PRN Trazodone - D/c'd 2/2 non-use  Continues on regimen of Melatonin 3mg qHS    Hyperlipidemia, unspecified hyperlipidemia type  Chronic  Recent Labs   Lab Test 03/16/18   CHOL  124   HDL  48   LDL  56   TRIG  99   On regimen of Pravachol     Slow transit constipation  Patient denies difficulties with constipation today  On regimen of Miralax and Nutrisource fiber    Pain  2/2 above noted diagnoses and hosptializations  Managed on Tylenol #3 and Gabapentin as noted above  Has used Tylenol #3 a total of 2 times in the past 4 days    Physical deconditioning  2/2 above noted diagnoses and hospitalizations  Discharged from therapies as noted above  2/2 above noted diagnoses and hospitalizations    ALLERGIES: No known allergies  Past Medical, Surgical, Family and Social History reviewed and updated in [employer].    Current Outpatient Prescriptions   Medication Sig Dispense Refill     acetaminophen-codeine (TYLENOL #3) 300-30 MG per tablet Take 1-2 tablets by mouth every 4 hours as needed for moderate pain       gabapentin (NEURONTIN) 100 MG capsule Take 100 mg by mouth 2 times daily       melatonin 3 MG tablet Take 3 mg by mouth At Bedtime       polyethylene glycol (MIRALAX/GLYCOLAX) powder Take 17 g by mouth 2 times daily as needed  "for constipation       fiber modular (NUTRISOURCE FIBER) packet 1 packet 2 times daily as needed       pravastatin (PRAVACHOL) 20 MG tablet Take 40 mg by mouth At Bedtime       Medications reviewed:  Medications reconciled to facility chart and changes were made to reflect current medications as identified as above med list. Below are the changes that were made:   Medications stopped since last EPIC medication reconciliation:   Medications Discontinued During This Encounter   Medication Reason     traZODone (DESYREL) 50 MG tablet Discontinued by another Health Care Provider       Medications started since last [facility] medication reconciliation:  No orders of the defined types were placed in this encounter.    REVIEW OF SYSTEMS:  4 point ROS including Respiratory, CV, GI and , other than that noted in the HPI,  is negative    Physical Exam:  /71  Pulse 79  Temp 97.2  F (36.2  C)  Resp 20  Ht 5' 11\" (1.803 m)  Wt 171 lb 3.2 oz (77.7 kg)  SpO2 90%  BMI 23.88 kg/m2  BP Readin/71, 123/70, 148/81                  HR:  72-91  GENERAL APPEARANCE:  Alert, in no distress, oriented, thin, somnolent, cooperative, middle-aged man sitting on the edge of his bed  ENT:  Mouth and posterior oropharynx normal, moist mucous membranes, normal hearing acuity, poor dentition  EYES:  EOM, conjunctivae, lids, pupils and irises normal, wears glasses  NECK:  No adenopathy, masses or thyromegaly, trach scar present  RESP:  Respiratory effort and palpation of chest normal, lungs clear to auscultation, no respiratory distress  CV:  Palpation and auscultation of heart done, regular rate and rhythm, no murmur, rub, or gallop, no edema, +2 pedal pulses  ABDOMEN:  normal bowel sounds, soft, nontender, no hepatosplenomegaly or other masses  M/S:   Gait and station normal; Digits and nails normal  SKIN:  Inspection of skin and subcutaneous tissue baseline, Palpation of skin and subcutaneous tissue baseline  NEURO:   Cranial nerves " 2-12 are normal tested and grossly at patient's baseline  PSYCH:  oriented X 3, normal insight, judgement and memory, affect and mood normal, flat, withdrawn     Recent Labs:   CBC RESULTS:   Recent Labs   Lab Test 03/16/18   WBC  7.9   RBC  3.80*   HGB  12.0*   HCT  37.4*   MCV  98.4   RDW  13.8   PLT  327       Last Basic Metabolic Panel:  Recent Labs   Lab Test 03/16/18   NA  140   POTASSIUM  4.3   CHLORIDE  108   BLUE  9.7   CO2  27   BUN  16   CR  0.87   GLC  88         Assessment/Plan:   Diagnosis Comments   1. SAH (subarachnoid hemorrhage) (H)   Status post coil embolization of cerebral aneurysm  Patient followed closely by Neurology  Discharged from therapies for attaining patient baseline  Stable at this time  F/u with Neurology per their recommendations   2. Hydrocephalus    S/P ventriculoperitoneal shunt     Stable  VSS   3. Respiratory failure following trauma and surgery (H)    Status post tracheostomy (H)    HAP (hospital-acquired pneumonia)     No respiratory concerns at this time  Resolved   4. Oropharyngeal dysphagia Appears to have resolved  Tolerating Regular diet without difficulty   5. Malnutrition, unspecified type (H)  Dietary involvement  Weight slowly improving  Continue to monitor patient's weight  Adapt as needed  Patient's BMI WNL at this time  Resolved   6. Chronic deep vein thrombosis (DVT) of distal vein of right lower extremity (H)    Internal jugular (IJ) vein thromboembolism, chronic, right (H)     No anticoagulation as noted above  Continue to monitor for s/sx of recurrence  Stable without medical intervention   7. Homeless  Working with sons/SW on placement at this time   8. Neuropathy  Managed on regimen of Gabapentin with good relief  Stable on current regimen; continue medications as currently ordered.   9. Insomnia due to medical condition  Adjustment disorder with depressed mood Declines in-house psych involvement at this time; did reassure their availability to him as he  ameya  Was admitted on regimen of Lexapro and Remeron - he has declined these medications since admission to facility; please monitor for ongoing/increased sx of depression with cessation of this medication  Continues on regimen of Melatonin   Stable on current regimen; continue medications as currently ordered.   10. Hyperlipidemia, unspecified hyperlipidemia type  Chronic  Stable on current regimen; continue medications as currently ordered.  Continue Pravachol as ordered   11. Slow transit constipation Stable on current regimen; continue medications as currently ordered.   12. Pain  Stable on current regimen; continue medications as currently ordered.   13. Physical deconditioning  PT/OT completed  Will remain in facility while placement is located       Electronically signed by  SHANICE Markham CNP                      Sincerely,        SHANICE Markham CNP

## 2018-04-18 VITALS
BODY MASS INDEX: 23.85 KG/M2 | OXYGEN SATURATION: 96 % | HEART RATE: 95 BPM | DIASTOLIC BLOOD PRESSURE: 73 MMHG | WEIGHT: 171 LBS | TEMPERATURE: 98.8 F | RESPIRATION RATE: 20 BRPM | SYSTOLIC BLOOD PRESSURE: 105 MMHG

## 2018-04-18 NOTE — PROGRESS NOTES
[facility]    Chief Complaint   Patient presents with     RECHECK       HPI:    Cornelius Wolfe is a 57 year old  (1960), who is being seen today for an episodic care visit at [facility].  HPI information obtained from: facility chart records, facility staff, patient report and [facility] chart review.Today's concern is:  SAH (subarachnoid hemorrhage) (H)  Status post coil embolization of cerebral aneurysm      Patient independent at this point in most ADLs requiring a less intense level of therapies prior to d/c back into the community  Continues with neuropathic pain to hands as noted below  PT/OT Discharged patient on 3/23:    PT: Discharge summary: patient has been seen for 8 skilled PT treatment sessions. Demonstrates the ability to ambulate independently throughout facility without AD. Performs transfers and bed mobility independently.  Patient has been provided with a home exercise program to prevent decline in functional mobility following D/C from PT. Patient  to remainin LTC facility pending placement in [facility]. Nofurther skilled therapeutic intervention is indicated at this time.   OT Discharged patient on 3/23:    Functional activity:     CPT shop task 5/6.     Pt successful to complete 10/12 5 and 6 step sequencing tasks independently.    CPT MED BOX- 5.0/6.0    SAFETY-  17 / 18    Functional activity:     Short Blessed score 14/28 suggests significant cognitive impairment, difficulty noted with short term memory and orientation. Discussed compensatory strategies for STM i.e. reminders in phone, help from friends and family. OT administered the SLUMS to pt.  Pt. scored 20/30.  This score suggests dementia cognition.    Pt. scored 22/30 on the MMSE-2.  This score suggests Mild cognitive impairment.    3/23/2018- Last day skilled OT with goals met,      Hydrocephalus  S/P ventriculoperitoneal shunt  Denies headaches or increased difficulties with balance; no noted  "confusion - baseline forgetfulness  Baseline cognitive impairment as noted above    Respiratory failure following trauma and surgery (H)  Status post tracheostomy (H)  HAP (hospital-acquired pneumonia)  Denies cough/SOB/TORRES today  Trach scar healed well w/o difficulty    Oropharyngeal dysphagia    Patient tolerating regular diet without difficulty    Malnutrition, unspecified type (H)  Admit weight 166lbs  Patient continues to regain weight slowly - BMI WNL  Diet as noted above   Does receive house supplement BID  Dietary following  Wt Readings from Last 4 Encounters:   04/18/18 171 lb (77.6 kg)   04/11/18 171 lb 3.2 oz (77.7 kg)   03/29/18 168 lb (76.2 kg)   03/22/18 168 lb 14.4 oz (76.6 kg)     Estimated body mass index is 23.85 kg/(m^2) as calculated from the following:    Height as of 4/11/18: 5' 11\" (1.803 m).    Weight as of this encounter: 171 lb (77.6 kg).    Chronic deep vein thrombosis (DVT) of distal vein of right lower extremity (H)  Internal jugular (IJ) vein thromboembolism, chronic, right (H)    No current anticoagulation 2/2 hemorrhage as noted above    Homeless  2/2 to extended hospitalization  He states he did have his own apartment prior to this event, but he no longer has this.  He notes that he has been working with his sons at locating a place for him to move following his stay in the facility - He is also working with facility   Today he tells me that he met with [facility] to discuss placement and connection with relocation - he states he was getting worried thinking that things were getting forgotten about    Neuropathy  2/2 to subarachnoid hemorrhage as noted above  Baseline in hands and increases with activity as noted with therapy notes -  He states that it's \"okay\" today and continues to decline the need for adjustment - he does note that the pain doesn't effect his ability to complete daily activities  Currently on regimen of Gabapentin and PRN Tylenol #3    Insomnia due to medical " condition  Adjustment disorder with depressed mood  Admitted on regimen of Lexapro, Remeron, Trazodone and Melatonin  He declined both Lexapro and Remeron upon arrival to the facility, he notes that he is stressed and slightly depressed at times but feels as though he is able to cope with these things at this time without these medications on board  Did speak with him about depression being completely normal following his extensive health concerns since December and assured him that we would be here to work with him through these things and that we also have in-house psychology/psychiatry available to him as he wishes  Does tend to stick to himself, 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 - staff to encourage participation in facility activities  Last PHQ9 (3/20/18): 01/27  Both Melatonin and Trazodone have been d/c'd 2/2 non-use and ineffectiveness    Hyperlipidemia, unspecified hyperlipidemia type  Chronic  Recent Labs   Lab Test 03/16/18   CHOL  124   HDL  48   LDL  56   TRIG  99   On regimen of Pravachol     Slow transit constipation  Patient denies difficulties with constipation today  On regimen of Miralax and Nutrisource fiber    Pain  2/2 above noted diagnoses and hosptializations  Managed on Tylenol #3 and Gabapentin as noted above  Has used Tylenol #3 a total of 3 times in the past 7 days    Physical deconditioning  2/2 above noted diagnoses and hospitalizations  Discharged from therapies as noted above    ALLERGIES: No known allergies  Past Medical, Surgical, Family and Social History reviewed and updated in [employer].    Current Outpatient Prescriptions   Medication Sig Dispense Refill     acetaminophen-codeine (TYLENOL #3) 300-30 MG per tablet Take 1-2 tablets by mouth every 4 hours as needed for moderate pain       fiber modular (NUTRISOURCE FIBER) packet 1 packet 2 times daily as needed       gabapentin (NEURONTIN) 100 MG capsule Take 100 mg by mouth 2  times daily       melatonin 3 MG tablet Take 3 mg by mouth At Bedtime       polyethylene glycol (MIRALAX/GLYCOLAX) powder Take 17 g by mouth 2 times daily as needed for constipation       pravastatin (PRAVACHOL) 20 MG tablet Take 40 mg by mouth At Bedtime       Medications reviewed:  Medications reconciled to facility chart and changes were made to reflect current medications as identified as above med list. Below are the changes that were made:   Medications stopped since last EPIC medication reconciliation:   There are no discontinued medications.    Medications started since last [facility] medication reconciliation:  No orders of the defined types were placed in this encounter.    REVIEW OF SYSTEMS:  4 point ROS including Respiratory, CV, GI and , other than that noted in the HPI,  is negative    Physical Exam:  /73  Pulse 95  Temp 98.8  F (37.1  C)  Resp 20  Wt 171 lb (77.6 kg)  SpO2 96%  BMI 23.85 kg/m2  GENERAL APPEARANCE:  Alert, in no distress, oriented, thin, somnolent, cooperative, middle-aged man ambulating through dining room  ENT:  Mouth and posterior oropharynx normal, moist mucous membranes, normal hearing acuity, poor dentition  EYES:  EOM, conjunctivae, lids, pupils and irises normal, wears glasses  NECK:  No adenopathy, masses or thyromegaly, trach scar present  RESP:  Respiratory effort and palpation of chest normal, lungs clear to auscultation, no respiratory distress  CV:  Palpation and auscultation of heart done, regular rate and rhythm, no murmur, rub, or gallop, no edema, +2 pedal pulses  ABDOMEN: Normal bowel sounds, soft, nontender, no hepatosplenomegaly or other masses  M/S:   Gait and station normal; Digits and nails normal  SKIN:  Inspection of skin and subcutaneous tissue baseline, Palpation of skin and subcutaneous tissue baseline  NEURO:   Cranial nerves 2-12 are normal tested and grossly at patient's baseline  PSYCH:  oriented X 3, normal insight, judgement and memory,  affect and mood normal, flat. More friendly this visit.     Recent Labs:   CBC RESULTS:   Recent Labs   Lab Test 03/16/18   WBC  7.9   RBC  3.80*   HGB  12.0*   HCT  37.4*   MCV  98.4   RDW  13.8   PLT  327       Last Basic Metabolic Panel:  Recent Labs   Lab Test 03/16/18   NA  140   POTASSIUM  4.3   CHLORIDE  108   BLUE  9.7   CO2  27   BUN  16   CR  0.87   GLC  88     Assessment/Plan:   Diagnosis Comments   1. SAH (subarachnoid hemorrhage) (H)   Status post coil embolization of cerebral aneurysm  Patient followed closely by Neurology  Discharged from therapies for attaining patient baseline  Stable at this time  F/u with Neurology per their recommendations   2. Hydrocephalus    S/P ventriculoperitoneal shunt     Stable  VSS   3. Respiratory failure following trauma and surgery (H)    Status post tracheostomy (H)    HAP (hospital-acquired pneumonia)     No respiratory concerns at this time  Resolved   4. Oropharyngeal dysphagia Appears to have resolved  Tolerating Regular diet without difficulty   5. Malnutrition, unspecified type (H)  Dietary involvement  Resolved  Patient's BMI now WNL  Continue to monitor patient's weight  Adapt as needed   6. Chronic deep vein thrombosis (DVT) of distal vein of right lower extremity (H)    Internal jugular (IJ) vein thromboembolism, chronic, right (H)     No anticoagulation as noted above  Continue to monitor for s/sx of recurrence  Stable without medical intervention   7. Homeless  Working with sons/SW on placement at this time   8. Neuropathy  Managed on regimen of Gabapentin with good relief  Stable on current regimen; continue medications as currently ordered.   9. Insomnia due to medical condition  Adjustment disorder with depressed mood Declines in-house psych involvement at this time; did reassure their availability to him as he wishes  Was admitted on regimen of Lexapro and Remeron - he has declined these medications since admission to facility; please monitor for  ongoing/increased sx of depression with cessation of this medication  Off of all medications at this time - will monitor for ongoing needs   10. Hyperlipidemia, unspecified hyperlipidemia type  Chronic  Stable on current regimen; continue medications as currently ordered.  Continue Pravachol as ordered   11. Slow transit constipation Stable on current regimen; continue medications as currently ordered.   12. Pain  Stable on current regimen; continue medications as currently ordered.   13. Physical deconditioning  PT/OT completed  Will remain in facility while placement is located     Electronically signed by  SHANICE Markham, JOHN  [facility]

## 2018-04-19 ENCOUNTER — NURSING HOME VISIT (OUTPATIENT)
Dept: GERIATRICS | Facility: CLINIC | Age: 58
End: 2018-04-19
Payer: MEDICAID

## 2018-04-19 DIAGNOSIS — J18.9 HAP (HOSPITAL-ACQUIRED PNEUMONIA): ICD-10-CM

## 2018-04-19 DIAGNOSIS — Z93.0 STATUS POST TRACHEOSTOMY (H): ICD-10-CM

## 2018-04-19 DIAGNOSIS — E46 MALNUTRITION, UNSPECIFIED TYPE (H): ICD-10-CM

## 2018-04-19 DIAGNOSIS — I60.9 SAH (SUBARACHNOID HEMORRHAGE) (H): Primary | ICD-10-CM

## 2018-04-19 DIAGNOSIS — I82.C21 INTERNAL JUGULAR (IJ) VEIN THROMBOEMBOLISM, CHRONIC, RIGHT (H): ICD-10-CM

## 2018-04-19 DIAGNOSIS — R13.12 OROPHARYNGEAL DYSPHAGIA: ICD-10-CM

## 2018-04-19 DIAGNOSIS — Z98.890 STATUS POST COIL EMBOLIZATION OF CEREBRAL ANEURYSM: ICD-10-CM

## 2018-04-19 DIAGNOSIS — I82.5Z1 CHRONIC DEEP VEIN THROMBOSIS (DVT) OF DISTAL VEIN OF RIGHT LOWER EXTREMITY (H): ICD-10-CM

## 2018-04-19 DIAGNOSIS — G91.9 HYDROCEPHALUS (H): ICD-10-CM

## 2018-04-19 DIAGNOSIS — J95.821 RESPIRATORY FAILURE FOLLOWING TRAUMA AND SURGERY (H): ICD-10-CM

## 2018-04-19 DIAGNOSIS — G62.9 NEUROPATHY: ICD-10-CM

## 2018-04-19 DIAGNOSIS — E78.5 HYPERLIPIDEMIA, UNSPECIFIED HYPERLIPIDEMIA TYPE: ICD-10-CM

## 2018-04-19 DIAGNOSIS — F43.21 ADJUSTMENT DISORDER WITH DEPRESSED MOOD: ICD-10-CM

## 2018-04-19 DIAGNOSIS — G47.01 INSOMNIA DUE TO MEDICAL CONDITION: ICD-10-CM

## 2018-04-19 DIAGNOSIS — K59.01 SLOW TRANSIT CONSTIPATION: ICD-10-CM

## 2018-04-19 DIAGNOSIS — Z59.00 HOMELESS: ICD-10-CM
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
## 2018-04-19 DIAGNOSIS — Z98.2 S/P VENTRICULOPERITONEAL SHUNT: ICD-10-CM

## 2018-04-19 DIAGNOSIS — R53.81 PHYSICAL DECONDITIONING: ICD-10-CM

## 2018-04-19 PROCEDURE — 99309 SBSQ NF CARE MODERATE MDM 30: CPT | Performed by: NURSE PRACTITIONER

## 2018-04-19 SDOH — ECONOMIC STABILITY - HOUSING INSECURITY: HOMELESSNESS UNSPECIFIED: Z59.00

## 2018-04-19 NOTE — LETTER
4/19/2018        RE: Cornelius Wolfe  [address] [facility] [address]        [facility]    Chief Complaint   Patient presents with     RECHECK       HPI:    Cornelius Wolfe is a 57 year old  (1960), who is being seen today for an episodic care visit at [facility].  HPI information obtained from: facility chart records, facility staff, patient report and [facility] chart review.Today's concern is:  SAH (subarachnoid hemorrhage) (H)  Status post coil embolization of cerebral aneurysm      Patient independent at this point in most ADLs requiring a less intense level of therapies prior to d/c back into the community  Continues with neuropathic pain to hands as noted below  PT/OT Discharged patient on 3/23:    PT: Discharge summary: patient has been seen for 8 skilled PT treatment sessions. Demonstrates the ability to ambulate independently throughout facility without AD. Performs transfers and bed mobility independently.  Patient has been provided with a home exercise program to prevent decline in functional mobility following D/C from PT. Patient  to remainin LTC facility pending placement in long term. Nofurther skilled therapeutic intervention is indicated at this time.   OT Discharged patient on 3/23:    Functional activity:     CPT shop task 5/6.     Pt successful to complete 10/12 5 and 6 step sequencing tasks independently.    CPT MED BOX- 5.0/6.0    SAFETY-  17 / 18    Functional activity:     Short Blessed score 14/28 suggests significant cognitive impairment, difficulty noted with short term memory and orientation. Discussed compensatory strategies for STM i.e. reminders in phone, help from friends and family. OT administered the SLUMS to pt.  Pt. scored 20/30.  This score suggests dementia cognition.    Pt. scored 22/30 on the MMSE-2.  This score suggests Mild cognitive impairment.    3/23/2018- Last day skilled OT with goals met,      Hydrocephalus  S/P  "ventriculoperitoneal shunt  Denies headaches or increased difficulties with balance; no noted confusion - baseline forgetfulness  Baseline cognitive impairment as noted above    Respiratory failure following trauma and surgery (H)  Status post tracheostomy (H)  HAP (hospital-acquired pneumonia)  Denies cough/SOB/TORRES today  Trach scar healed well w/o difficulty    Oropharyngeal dysphagia    Patient tolerating regular diet without difficulty    Malnutrition, unspecified type (H)  Admit weight 166lbs  Patient continues to regain weight slowly - BMI WNL  Diet as noted above   Does receive house supplement BID  Dietary following  Wt Readings from Last 4 Encounters:   04/18/18 171 lb (77.6 kg)   04/11/18 171 lb 3.2 oz (77.7 kg)   03/29/18 168 lb (76.2 kg)   03/22/18 168 lb 14.4 oz (76.6 kg)     Estimated body mass index is 23.85 kg/(m^2) as calculated from the following:    Height as of 4/11/18: 5' 11\" (1.803 m).    Weight as of this encounter: 171 lb (77.6 kg).    Chronic deep vein thrombosis (DVT) of distal vein of right lower extremity (H)  Internal jugular (IJ) vein thromboembolism, chronic, right (H)    No current anticoagulation 2/2 hemorrhage as noted above    Homeless  2/2 to extended hospitalization  He states he did have his own apartment prior to this event, but he no longer has this.  He notes that he has been working with his sons at locating a place for him to move following his stay in the facility - He is also working with [facility]  Today he tells me that he met with [facility] to discuss placement and connection with relocation - he states he was getting worried thinking that things were getting forgotten about    Neuropathy  2/2 to subarachnoid hemorrhage as noted above  Baseline in hands and increases with activity as noted with therapy notes -  He states that it's \"okay\" today and continues to decline the need for adjustment - he does note that the pain doesn't effect his ability to complete daily " activities  Currently on regimen of Gabapentin and PRN Tylenol #3    Insomnia due to medical condition  Adjustment disorder with depressed mood  Admitted on regimen of Lexapro, Remeron, Trazodone and Melatonin  He declined both Lexapro and Remeron upon arrival to the facility, he notes that he is stressed and slightly depressed at times but feels as though he is able to cope with these things at this time without these medications on board  Did speak with him about depression being completely normal following his extensive health concerns since December and assured him that we would be here to work with him through these things and that we also have in-house psychology/psychiatry available to him as he wishes  Does tend to stick to himself, 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 - staff to encourage participation in facility activities  Last PHQ9 (3/20/18): 01/27  Both Melatonin and Trazodone have been d/c'd 2/2 non-use and ineffectiveness    Hyperlipidemia, unspecified hyperlipidemia type  Chronic  Recent Labs   Lab Test 03/16/18   CHOL  124   HDL  48   LDL  56   TRIG  99   On regimen of Pravachol     Slow transit constipation  Patient denies difficulties with constipation today  On regimen of Miralax and Nutrisource fiber    Pain  2/2 above noted diagnoses and hosptializations  Managed on Tylenol #3 and Gabapentin as noted above  Has used Tylenol #3 a total of 3 times in the past 7 days    Physical deconditioning  2/2 above noted diagnoses and hospitalizations  Discharged from therapies as noted above    ALLERGIES: No known allergies  Past Medical, Surgical, Family and Social History reviewed and updated in [facility].    Current Outpatient Prescriptions   Medication Sig Dispense Refill     acetaminophen-codeine (TYLENOL #3) 300-30 MG per tablet Take 1-2 tablets by mouth every 4 hours as needed for moderate pain       fiber modular (NUTRISOURCE FIBER) packet 1 packet 2  times daily as needed       gabapentin (NEURONTIN) 100 MG capsule Take 100 mg by mouth 2 times daily       melatonin 3 MG tablet Take 3 mg by mouth At Bedtime       polyethylene glycol (MIRALAX/GLYCOLAX) powder Take 17 g by mouth 2 times daily as needed for constipation       pravastatin (PRAVACHOL) 20 MG tablet Take 40 mg by mouth At Bedtime       Medications reviewed:  Medications reconciled to facility chart and changes were made to reflect current medications as identified as above med list. Below are the changes that were made:   Medications stopped since last EPIC medication reconciliation:   There are no discontinued medications.    Medications started since last [facility] medication reconciliation:  No orders of the defined types were placed in this encounter.    REVIEW OF SYSTEMS:  4 point ROS including Respiratory, CV, GI and , other than that noted in the HPI,  is negative    Physical Exam:  /73  Pulse 95  Temp 98.8  F (37.1  C)  Resp 20  Wt 171 lb (77.6 kg)  SpO2 96%  BMI 23.85 kg/m2  GENERAL APPEARANCE:  Alert, in no distress, oriented, thin, somnolent, cooperative, middle-aged man ambulating through dining room  ENT:  Mouth and posterior oropharynx normal, moist mucous membranes, normal hearing acuity, poor dentition  EYES:  EOM, conjunctivae, lids, pupils and irises normal, wears glasses  NECK:  No adenopathy, masses or thyromegaly, trach scar present  RESP:  Respiratory effort and palpation of chest normal, lungs clear to auscultation, no respiratory distress  CV:  Palpation and auscultation of heart done, regular rate and rhythm, no murmur, rub, or gallop, no edema, +2 pedal pulses  ABDOMEN: Normal bowel sounds, soft, nontender, no hepatosplenomegaly or other masses  M/S:   Gait and station normal; Digits and nails normal  SKIN:  Inspection of skin and subcutaneous tissue baseline, Palpation of skin and subcutaneous tissue baseline  NEURO:   Cranial nerves 2-12 are normal tested and grossly  at patient's baseline  PSYCH:  oriented X 3, normal insight, judgement and memory, affect and mood normal, flat. More friendly this visit.     Recent Labs:   CBC RESULTS:   Recent Labs   Lab Test 03/16/18   WBC  7.9   RBC  3.80*   HGB  12.0*   HCT  37.4*   MCV  98.4   RDW  13.8   PLT  327       Last Basic Metabolic Panel:  Recent Labs   Lab Test 03/16/18   NA  140   POTASSIUM  4.3   CHLORIDE  108   BLUE  9.7   CO2  27   BUN  16   CR  0.87   GLC  88     Assessment/Plan:   Diagnosis Comments   1. SAH (subarachnoid hemorrhage) (H)   Status post coil embolization of cerebral aneurysm  Patient followed closely by Neurology  Discharged from therapies for attaining patient baseline  Stable at this time  F/u with Neurology per their recommendations   2. Hydrocephalus    S/P ventriculoperitoneal shunt     Stable  VSS   3. Respiratory failure following trauma and surgery (H)    Status post tracheostomy (H)    HAP (hospital-acquired pneumonia)     No respiratory concerns at this time  Resolved   4. Oropharyngeal dysphagia Appears to have resolved  Tolerating Regular diet without difficulty   5. Malnutrition, unspecified type (H)  Dietary involvement  Resolved  Patient's BMI now WNL  Continue to monitor patient's weight  Adapt as needed   6. Chronic deep vein thrombosis (DVT) of distal vein of right lower extremity (H)    Internal jugular (IJ) vein thromboembolism, chronic, right (H)     No anticoagulation as noted above  Continue to monitor for s/sx of recurrence  Stable without medical intervention   7. Homeless  Working with sons/SW on placement at this time   8. Neuropathy  Managed on regimen of Gabapentin with good relief  Stable on current regimen; continue medications as currently ordered.   9. Insomnia due to medical condition  Adjustment disorder with depressed mood Declines in-house psych involvement at this time; did reassure their availability to him as he wishes  Was admitted on regimen of Lexapro and Remeron - he  has declined these medications since admission to facility; please monitor for ongoing/increased sx of depression with cessation of this medication  Off of all medications at this time - will monitor for ongoing needs   10. Hyperlipidemia, unspecified hyperlipidemia type  Chronic  Stable on current regimen; continue medications as currently ordered.  Continue Pravachol as ordered   11. Slow transit constipation Stable on current regimen; continue medications as currently ordered.   12. Pain  Stable on current regimen; continue medications as currently ordered.   13. Physical deconditioning  PT/OT completed  Will remain in facility while placement is located     Electronically signed by  SHANICE Markham, JOHN  [facility]      Sincerely,        SHANICE Markham CNP

## 2018-04-25 NOTE — PROGRESS NOTES
[facility]    Chief Complaint   Patient presents with     RECHECK       HPI:    Cornelius Wolfe is a 57 year old  (1960), who is being seen today for an episodic care visit at [facility].  HPI information obtained from: facility chart records, facility staff and patient report.    Today's concern is:  SAH (subarachnoid hemorrhage) (H)  Status post coil embolization of cerebral aneurysm      Patient independent at this point in most ADLs requiring a less intense level of therapies prior to d/c back into the community  Continues with neuropathic pain to hands as noted below  PT/OT Discharged patient on 3/23:    PT: Discharge summary: patient has been seen for 8 skilled PT treatment sessions. Demonstrates the ability to ambulate independently throughout facility without AD. Performs transfers and bed mobility independently.  Patient has been provided with a home exercise program to prevent decline in functional mobility following D/C from PT. Patient  to remainin LTC facility pending placement in snf. Nofurther skilled therapeutic intervention is indicated at this time.   OT Discharged patient on 3/23:    Functional activity:     CPT shop task 5/6.     Pt successful to complete 10/12 5 and 6 step sequencing tasks independently.    CPT MED BOX- 5.0/6.0    SAFETY-  17 / 18    Functional activity:     Short Blessed score 14/28 suggests significant cognitive impairment, difficulty noted with short term memory and orientation. Discussed compensatory strategies for STM i.e. reminders in phone, help from friends and family. OT administered the SLUMS to pt.  Pt. scored 20/30.  This score suggests dementia cognition.    Pt. scored 22/30 on the MMSE-2.  This score suggests Mild cognitive impairment.    3/23/2018- Last day skilled OT with goals met,      Hydrocephalus  S/P ventriculoperitoneal shunt  Denies headaches or increased difficulties with balance; no noted confusion - baseline  "forgetfulness  Baseline cognitive impairment as noted above    Respiratory failure following trauma and surgery (H)  Status post tracheostomy (H)  HAP (hospital-acquired pneumonia)  Denies cough/SOB/TORRES today  Trach scar healed well w/o difficulty    Oropharyngeal dysphagia    Patient tolerating regular diet without difficulty    Malnutrition, unspecified type (H)  Admit weight 166lbs  Patient continues to regain weight slowly - BMI WNL  Diet as noted above   Does receive house supplement BID  Dietary following  Wt Readings from Last 4 Encounters:   04/25/18 171 lb (77.6 kg)   04/18/18 171 lb (77.6 kg)   04/11/18 171 lb 3.2 oz (77.7 kg)   03/29/18 168 lb (76.2 kg)     Estimated body mass index is 23.85 kg/(m^2) as calculated from the following:    Height as of this encounter: 5' 11\" (1.803 m).    Weight as of this encounter: 171 lb (77.6 kg).    Chronic deep vein thrombosis (DVT) of distal vein of right lower extremity (H)  Internal jugular (IJ) vein thromboembolism, chronic, right (H)    No current anticoagulation 2/2 hemorrhage as noted above    Homeless  2/2 to extended hospitalization  He states he did have his own apartment prior to this event, but he no longer has this.  He notes that he has been working with his sons at locating a place for him to move following his stay in the facility - He is also working with facility SW    Neuropathy  2/2 to subarachnoid hemorrhage as noted above  Baseline in hands and increases with activity as noted with therapy notes -  He states that it's \"okay\" today and continues to decline the need for adjustment - he does note that the pain doesn't effect his ability to complete daily activities  Currently on regimen of Gabapentin and PRN Tylenol #3    Insomnia due to medical condition  Adjustment disorder with depressed mood  Admitted on regimen of Lexapro, Remeron, Trazodone and Melatonin  He declined both Lexapro and Remeron upon arrival to the facility, he notes that he is " stressed and slightly depressed at times but feels as though he is able to cope with these things at this time without these medications on board  Did speak with him about depression being completely normal following his extensive health concerns since December and assured him that we would be here to work with him through these things and that we also have in-house psychology/psychiatry available to him as he wishes  Does tend to stick to himself, 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 - staff to encourage participation in facility activities  Last PHQ9 (3/20/18): 01/27  Both Melatonin and Trazodone have been d/c'd 2/2 non-use and ineffectiveness  Patient denies difficulties this week with sleeping at night    Hyperlipidemia, unspecified hyperlipidemia type  Chronic  Recent Labs   Lab Test 03/16/18   CHOL  124   HDL  48   LDL  56   TRIG  99   On regimen of Pravachol     Slow transit constipation  Patient denies difficulties with constipation today  On regimen of Miralax and Nutrisource fiber    Pain  2/2 above noted diagnoses and hosptializations  Managed on Tylenol #3 and Gabapentin as noted above  Has used Tylenol #3 a total of 3 times in the past 7 days    Physical deconditioning  2/2 above noted diagnoses and hospitalizations  Discharged from therapies as noted above    ALLERGIES: No known allergies  Past Medical, Surgical, Family and Social History reviewed and updated in [facility].    Current Outpatient Prescriptions   Medication Sig Dispense Refill     acetaminophen-codeine (TYLENOL #3) 300-30 MG per tablet Take 1-2 tablets by mouth every 4 hours as needed for moderate pain       fiber modular (NUTRISOURCE FIBER) packet 1 packet 2 times daily as needed       gabapentin (NEURONTIN) 100 MG capsule Take 100 mg by mouth 2 times daily       melatonin 3 MG tablet Take 3 mg by mouth At Bedtime       polyethylene glycol (MIRALAX/GLYCOLAX) powder Take 17 g by mouth 2  "times daily as needed for constipation       pravastatin (PRAVACHOL) 20 MG tablet Take 40 mg by mouth At Bedtime       Medications reviewed:  Medications reconciled to facility chart and changes were made to reflect current medications as identified as above med list. Below are the changes that were made:   Medications stopped since last EPIC medication reconciliation:   There are no discontinued medications.    Medications started since last [facility] medication reconciliation:  No orders of the defined types were placed in this encounter.    REVIEW OF SYSTEMS:  4 point ROS including Respiratory, CV, GI and , other than that noted in the HPI,  is negative    Physical Exam:  /76  Pulse 117  Temp 98.4  F (36.9  C)  Resp 20  Ht 5' 11\" (1.803 m)  Wt 171 lb (77.6 kg)  SpO2 97%  BMI 23.85 kg/m2  GENERAL APPEARANCE:  Alert, in no distress, oriented, thin, somnolent, cooperative, middle-aged man sitting on bed  ENT:  Mouth and posterior oropharynx normal, moist mucous membranes, normal hearing acuity, poor dentition  EYES:  EOM, conjunctivae, lids, pupils and irises normal, wears glasses  NECK:  No adenopathy, masses or thyromegaly, trach scar present  RESP:  Respiratory effort and palpation of chest normal, lungs clear to auscultation, no respiratory distress  CV:  Palpation and auscultation of heart done, regular rate and rhythm, no murmur, rub, or gallop, no edema, +2 pedal pulses  ABDOMEN: Normal bowel sounds, soft, nontender, no hepatosplenomegaly or other masses  M/S:   Gait and station normal; Digits and nails normal  SKIN:  Inspection of skin and subcutaneous tissue baseline, Palpation of skin and subcutaneous tissue baseline  NEURO:   Cranial nerves 2-12 are normal tested and grossly at patient's baseline  PSYCH:  oriented X 3, normal insight, judgement and memory, affect and mood normal, flat. More friendly this visit.      BP Reading:                              HR:    105/73                     "            109/71  123/70    Recent Labs:   CBC RESULTS:   Recent Labs   Lab Test 03/16/18   WBC  7.9   RBC  3.80*   HGB  12.0*   HCT  37.4*   MCV  98.4   RDW  13.8   PLT  327       Last Basic Metabolic Panel:  Recent Labs   Lab Test 03/16/18   NA  140   POTASSIUM  4.3   CHLORIDE  108   BLUE  9.7   CO2  27   BUN  16   CR  0.87   GLC  88         Assessment/Plan:   Diagnosis Comments   1. SAH (subarachnoid hemorrhage) (H)   Status post coil embolization of cerebral aneurysm  Patient followed closely by Neurology  Discharged from therapies for attaining patient baseline  Stable at this time  F/u with Neurology per their recommendations   2. Hydrocephalus    S/P ventriculoperitoneal shunt     Stable  VSS   3. Respiratory failure following trauma and surgery (H)    Status post tracheostomy (H)    HAP (hospital-acquired pneumonia)     No respiratory concerns at this time  Resolved   4. Oropharyngeal dysphagia Appears to have resolved  Tolerating Regular diet without difficulty   5. Malnutrition, unspecified type (H)  Dietary involvement  Resolved  Patient's BMI now WNL  Continue to monitor patient's weight  Adapt as needed   6. Chronic deep vein thrombosis (DVT) of distal vein of right lower extremity (H)    Internal jugular (IJ) vein thromboembolism, chronic, right (H)     No anticoagulation as noted above  Continue to monitor for s/sx of recurrence  Stable without medical intervention   7. Homeless  Working with sons/SW on placement at this time   8. Neuropathy  Managed on regimen of Gabapentin with good relief  Stable on current regimen; continue medications as currently ordered.   9. Insomnia due to medical condition  Adjustment disorder with depressed mood Declines in-house psych involvement at this time; did reassure their availability to him as he wishes  Was admitted on regimen of Lexapro and Remeron - he has declined these medications since admission to facility; please monitor for ongoing/increased sx of  depression with cessation of this medication  Off of all medications at this time - will monitor for ongoing needs   10. Hyperlipidemia, unspecified hyperlipidemia type  Chronic  Stable on current regimen; continue medications as currently ordered.  Continue Pravachol as ordered   11. Slow transit constipation Stable on current regimen; continue medications as currently ordered.   12. Pain  Stable on current regimen; continue medications as currently ordered.   13. Physical deconditioning  PT/OT completed  Will remain in facility while placement is located       Electronically signed by  SHANICE Markham CNP

## 2018-04-26 ENCOUNTER — NURSING HOME VISIT (OUTPATIENT)
Dept: GERIATRICS | Facility: CLINIC | Age: 58
End: 2018-04-26
Payer: MEDICAID

## 2018-04-26 VITALS
BODY MASS INDEX: 23.94 KG/M2 | DIASTOLIC BLOOD PRESSURE: 76 MMHG | OXYGEN SATURATION: 97 % | WEIGHT: 171 LBS | HEART RATE: 117 BPM | TEMPERATURE: 98.4 F | SYSTOLIC BLOOD PRESSURE: 105 MMHG | HEIGHT: 71 IN | RESPIRATION RATE: 20 BRPM

## 2018-04-26 DIAGNOSIS — Z59.00 HOMELESS: ICD-10-CM

## 2018-04-26 DIAGNOSIS — Z98.2 S/P VENTRICULOPERITONEAL SHUNT: ICD-10-CM

## 2018-04-26 DIAGNOSIS — K59.01 SLOW TRANSIT CONSTIPATION: ICD-10-CM

## 2018-04-26 DIAGNOSIS — G91.9 HYDROCEPHALUS (H): ICD-10-CM

## 2018-04-26 DIAGNOSIS — E46 MALNUTRITION, UNSPECIFIED TYPE (H): ICD-10-CM

## 2018-04-26 DIAGNOSIS — R13.12 OROPHARYNGEAL DYSPHAGIA: ICD-10-CM

## 2018-04-26 DIAGNOSIS — I82.C21 INTERNAL JUGULAR (IJ) VEIN THROMBOEMBOLISM, CHRONIC, RIGHT (H): ICD-10-CM

## 2018-04-26 DIAGNOSIS — Z98.890 STATUS POST COIL EMBOLIZATION OF CEREBRAL ANEURYSM: ICD-10-CM

## 2018-04-26 DIAGNOSIS — I82.5Z1 CHRONIC DEEP VEIN THROMBOSIS (DVT) OF DISTAL VEIN OF RIGHT LOWER EXTREMITY (H): ICD-10-CM

## 2018-04-26 DIAGNOSIS — J18.9 HAP (HOSPITAL-ACQUIRED PNEUMONIA): ICD-10-CM

## 2018-04-26 DIAGNOSIS — J95.821 RESPIRATORY FAILURE FOLLOWING TRAUMA AND SURGERY (H): ICD-10-CM

## 2018-04-26 DIAGNOSIS — E78.5 HYPERLIPIDEMIA, UNSPECIFIED HYPERLIPIDEMIA TYPE: ICD-10-CM

## 2018-04-26 DIAGNOSIS — Z93.0 STATUS POST TRACHEOSTOMY (H): ICD-10-CM

## 2018-04-26 DIAGNOSIS — F43.21 ADJUSTMENT DISORDER WITH DEPRESSED MOOD: ICD-10-CM

## 2018-04-26 DIAGNOSIS — G47.01 INSOMNIA DUE TO MEDICAL CONDITION: ICD-10-CM

## 2018-04-26 DIAGNOSIS — R53.81 PHYSICAL DECONDITIONING: ICD-10-CM

## 2018-04-26 DIAGNOSIS — R52 PAIN: ICD-10-CM

## 2018-04-26 DIAGNOSIS — G62.9 NEUROPATHY: ICD-10-CM
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
## 2018-04-26 DIAGNOSIS — I60.9 SAH (SUBARACHNOID HEMORRHAGE) (H): Primary | ICD-10-CM

## 2018-04-26 PROCEDURE — 99309 SBSQ NF CARE MODERATE MDM 30: CPT | Performed by: NURSE PRACTITIONER

## 2018-04-26 SDOH — ECONOMIC STABILITY - HOUSING INSECURITY: HOMELESSNESS UNSPECIFIED: Z59.00

## 2018-04-26 NOTE — LETTER
4/26/2018        RE: Cornelius Wolfe  [address] [address] [place] [id number]        [facility]    Chief Complaint   Patient presents with     RECHECK       HPI:    Cornelius Wolfe is a 57 year old  (1960), who is being seen today for an episodic care visit at [facility].  HPI information obtained from: facility chart records, facility staff and patient report.    Today's concern is:  SAH (subarachnoid hemorrhage) (H)  Status post coil embolization of cerebral aneurysm      Patient independent at this point in most ADLs requiring a less intense level of therapies prior to d/c back into the community  Continues with neuropathic pain to hands as noted below  PT/OT Discharged patient on 3/23:    PT: Discharge summary: patient has been seen for 8 skilled PT treatment sessions. Demonstrates the ability to ambulate independently throughout facility without AD. Performs transfers and bed mobility independently.  Patient has been provided with a home exercise program to prevent decline in functional mobility following D/C from PT. Patient  to remainin LTC facility pending placement in [facility]. Nofurther skilled therapeutic intervention is indicated at this time.   OT Discharged patient on 3/23:    Functional activity:     CPT shop task 5/6.     Pt successful to complete 10/12 5 and 6 step sequencing tasks independently.    CPT MED BOX- 5.0/6.0    SAFETY-  17 / 18    Functional activity:     Short Blessed score 14/28 suggests significant cognitive impairment, difficulty noted with short term memory and orientation. Discussed compensatory strategies for STM i.e. reminders in phone, help from friends and family. OT administered the SLUMS to pt.  Pt. scored 20/30.  This score suggests dementia cognition.    Pt. scored 22/30 on the MMSE-2.  This score suggests Mild cognitive impairment.    3/23/2018- Last day skilled OT with goals met,      Hydrocephalus  S/P ventriculoperitoneal shunt  Denies  "headaches or increased difficulties with balance; no noted confusion - baseline forgetfulness  Baseline cognitive impairment as noted above    Respiratory failure following trauma and surgery (H)  Status post tracheostomy (H)  HAP (hospital-acquired pneumonia)  Denies cough/SOB/TORRES today  Trach scar healed well w/o difficulty    Oropharyngeal dysphagia    Patient tolerating regular diet without difficulty    Malnutrition, unspecified type (H)  Admit weight 166lbs  Patient continues to regain weight slowly - BMI WNL  Diet as noted above   Does receive house supplement BID  Dietary following  Wt Readings from Last 4 Encounters:   04/25/18 171 lb (77.6 kg)   04/18/18 171 lb (77.6 kg)   04/11/18 171 lb 3.2 oz (77.7 kg)   03/29/18 168 lb (76.2 kg)     Estimated body mass index is 23.85 kg/(m^2) as calculated from the following:    Height as of this encounter: 5' 11\" (1.803 m).    Weight as of this encounter: 171 lb (77.6 kg).    Chronic deep vein thrombosis (DVT) of distal vein of right lower extremity (H)  Internal jugular (IJ) vein thromboembolism, chronic, right (H)    No current anticoagulation 2/2 hemorrhage as noted above    Homeless  2/2 to extended hospitalization  He states he did have his own apartment prior to this event, but he no longer has this.  He notes that he has been working with his sons at locating a place for him to move following his stay in the facility - He is also working with facility SW    Neuropathy  2/2 to subarachnoid hemorrhage as noted above  Baseline in hands and increases with activity as noted with therapy notes -  He states that it's \"okay\" today and continues to decline the need for adjustment - he does note that the pain doesn't effect his ability to complete daily activities  Currently on regimen of Gabapentin and PRN Tylenol #3    Insomnia due to medical condition  Adjustment disorder with depressed mood  Admitted on regimen of Lexapro, Remeron, Trazodone and Melatonin  He " declined both Lexapro and Remeron upon arrival to the facility, he notes that he is stressed and slightly depressed at times but feels as though he is able to cope with these things at this time without these medications on board  Did speak with him about depression being completely normal following his extensive health concerns since December and assured him that we would be here to work with him through these things and that we also have in-house psychology/psychiatry available to him as he wishes  Does tend to stick to himself, 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 - staff to encourage participation in facility activities  Last PHQ9 (3/20/18): 01/27  Both Melatonin and Trazodone have been d/c'd 2/2 non-use and ineffectiveness  Patient denies difficulties this week with sleeping at night    Hyperlipidemia, unspecified hyperlipidemia type  Chronic  Recent Labs   Lab Test 03/16/18   CHOL  124   HDL  48   LDL  56   TRIG  99   On regimen of Pravachol     Slow transit constipation  Patient denies difficulties with constipation today  On regimen of Miralax and Nutrisource fiber    Pain  2/2 above noted diagnoses and hosptializations  Managed on Tylenol #3 and Gabapentin as noted above  Has used Tylenol #3 a total of 3 times in the past 7 days    Physical deconditioning  2/2 above noted diagnoses and hospitalizations  Discharged from therapies as noted above    ALLERGIES: No known allergies  Past Medical, Surgical, Family and Social History reviewed and updated in [facility] [employer].    Current Outpatient Prescriptions   Medication Sig Dispense Refill     acetaminophen-codeine (TYLENOL #3) 300-30 MG per tablet Take 1-2 tablets by mouth every 4 hours as needed for moderate pain       fiber modular (NUTRISOURCE FIBER) packet 1 packet 2 times daily as needed       gabapentin (NEURONTIN) 100 MG capsule Take 100 mg by mouth 2 times daily       melatonin 3 MG tablet Take 3 mg by mouth At  "Bedtime       polyethylene glycol (MIRALAX/GLYCOLAX) powder Take 17 g by mouth 2 times daily as needed for constipation       pravastatin (PRAVACHOL) 20 MG tablet Take 40 mg by mouth At Bedtime       Medications reviewed:  Medications reconciled to facility chart and changes were made to reflect current medications as identified as above med list. Below are the changes that were made:   Medications stopped since last EPIC medication reconciliation:   There are no discontinued medications.    Medications started since last [facility] medication reconciliation:  No orders of the defined types were placed in this encounter.    REVIEW OF SYSTEMS:  4 point ROS including Respiratory, CV, GI and , other than that noted in the HPI,  is negative    Physical Exam:  /76  Pulse 117  Temp 98.4  F (36.9  C)  Resp 20  Ht 5' 11\" (1.803 m)  Wt 171 lb (77.6 kg)  SpO2 97%  BMI 23.85 kg/m2  GENERAL APPEARANCE:  Alert, in no distress, oriented, thin, somnolent, cooperative, middle-aged man sitting on bed  ENT:  Mouth and posterior oropharynx normal, moist mucous membranes, normal hearing acuity, poor dentition  EYES:  EOM, conjunctivae, lids, pupils and irises normal, wears glasses  NECK:  No adenopathy, masses or thyromegaly, trach scar present  RESP:  Respiratory effort and palpation of chest normal, lungs clear to auscultation, no respiratory distress  CV:  Palpation and auscultation of heart done, regular rate and rhythm, no murmur, rub, or gallop, no edema, +2 pedal pulses  ABDOMEN: Normal bowel sounds, soft, nontender, no hepatosplenomegaly or other masses  M/S:   Gait and station normal; Digits and nails normal  SKIN:  Inspection of skin and subcutaneous tissue baseline, Palpation of skin and subcutaneous tissue baseline  NEURO:   Cranial nerves 2-12 are normal tested and grossly at patient's baseline  PSYCH:  oriented X 3, normal insight, judgement and memory, affect and mood normal, flat. More friendly this visit.  "     BP Reading:                              HR:    105/73                                109/71  123/70    Recent Labs:   CBC RESULTS:   Recent Labs   Lab Test 03/16/18   WBC  7.9   RBC  3.80*   HGB  12.0*   HCT  37.4*   MCV  98.4   RDW  13.8   PLT  327       Last Basic Metabolic Panel:  Recent Labs   Lab Test 03/16/18   NA  140   POTASSIUM  4.3   CHLORIDE  108   BLUE  9.7   CO2  27   BUN  16   CR  0.87   GLC  88         Assessment/Plan:   Diagnosis Comments   1. SAH (subarachnoid hemorrhage) (H)   Status post coil embolization of cerebral aneurysm  Patient followed closely by Neurology  Discharged from therapies for attaining patient baseline  Stable at this time  F/u with Neurology per their recommendations   2. Hydrocephalus    S/P ventriculoperitoneal shunt     Stable  VSS   3. Respiratory failure following trauma and surgery (H)    Status post tracheostomy (H)    HAP (hospital-acquired pneumonia)     No respiratory concerns at this time  Resolved   4. Oropharyngeal dysphagia Appears to have resolved  Tolerating Regular diet without difficulty   5. Malnutrition, unspecified type (H)  Dietary involvement  Resolved  Patient's BMI now WNL  Continue to monitor patient's weight  Adapt as needed   6. Chronic deep vein thrombosis (DVT) of distal vein of right lower extremity (H)    Internal jugular (IJ) vein thromboembolism, chronic, right (H)     No anticoagulation as noted above  Continue to monitor for s/sx of recurrence  Stable without medical intervention   7. Homeless  Working with sons/SW on placement at this time   8. Neuropathy  Managed on regimen of Gabapentin with good relief  Stable on current regimen; continue medications as currently ordered.   9. Insomnia due to medical condition  Adjustment disorder with depressed mood Declines in-house psych involvement at this time; did reassure their availability to him as he wishes  Was admitted on regimen of Lexapro and Remeron - he has declined these  medications since admission to facility; please monitor for ongoing/increased sx of depression with cessation of this medication  Off of all medications at this time - will monitor for ongoing needs   10. Hyperlipidemia, unspecified hyperlipidemia type  Chronic  Stable on current regimen; continue medications as currently ordered.  Continue Pravachol as ordered   11. Slow transit constipation Stable on current regimen; continue medications as currently ordered.   12. Pain  Stable on current regimen; continue medications as currently ordered.   13. Physical deconditioning  PT/OT completed  Will remain in facility while placement is located       Electronically signed by  SHANICE Markham CNP                      Sincerely,        SHANICE Markham CNP

## 2018-05-01 ENCOUNTER — NURSING HOME VISIT (OUTPATIENT)
Dept: GERIATRICS | Facility: CLINIC | Age: 58
End: 2018-05-01
Payer: COMMERCIAL

## 2018-05-01 VITALS
SYSTOLIC BLOOD PRESSURE: 105 MMHG | OXYGEN SATURATION: 97 % | DIASTOLIC BLOOD PRESSURE: 76 MMHG | HEART RATE: 117 BPM | TEMPERATURE: 98.4 F | HEIGHT: 71 IN | BODY MASS INDEX: 23.94 KG/M2 | RESPIRATION RATE: 20 BRPM | WEIGHT: 171 LBS

## 2018-05-01 DIAGNOSIS — I60.9 SAH (SUBARACHNOID HEMORRHAGE) (H): Primary | ICD-10-CM

## 2018-05-01 DIAGNOSIS — Z98.890 STATUS POST COIL EMBOLIZATION OF CEREBRAL ANEURYSM: ICD-10-CM

## 2018-05-01 DIAGNOSIS — I82.5Z1 CHRONIC DEEP VEIN THROMBOSIS (DVT) OF DISTAL VEIN OF RIGHT LOWER EXTREMITY (H): ICD-10-CM

## 2018-05-01 DIAGNOSIS — R53.81 PHYSICAL DECONDITIONING: ICD-10-CM

## 2018-05-01 DIAGNOSIS — G62.9 NEUROPATHY: ICD-10-CM

## 2018-05-01 DIAGNOSIS — R13.12 OROPHARYNGEAL DYSPHAGIA: ICD-10-CM

## 2018-05-01 DIAGNOSIS — G47.01 INSOMNIA DUE TO MEDICAL CONDITION: ICD-10-CM

## 2018-05-01 DIAGNOSIS — F43.21 ADJUSTMENT DISORDER WITH DEPRESSED MOOD: ICD-10-CM

## 2018-05-01 PROCEDURE — 99309 SBSQ NF CARE MODERATE MDM 30: CPT | Performed by: NURSE PRACTITIONER

## 2018-05-01 NOTE — MR AVS SNAPSHOT
"              After Visit Summary   5/1/2018    Mr. Cornelius Wolfe    MRN: [id number]           Patient Information     Date Of Birth          1960        Visit Information        Provider Department      5/1/2018 8:00 AM Florinda Becker APRN CNP [facility]        Today's Diagnoses     SAH (subarachnoid hemorrhage) (H)    -  1    Status post coil embolization of cerebral aneurysm        Oropharyngeal dysphagia        Chronic deep vein thrombosis (DVT) of distal vein of right lower extremity (H)        Neuropathy        Insomnia due to medical condition        Adjustment disorder with depressed mood        Physical deconditioning           Follow-ups after your visit        Who to contact     If you have questions or need follow up information about today's clinic visit or your schedule please contact [facility] directly at [phone].  Normal or non-critical lab and imaging results will be communicated to you by [employer], letter or phone within 4 business days after the clinic has received the results. If you do not hear from us within 7 days, please contact the clinic through [employer] or phone. If you have a critical or abnormal lab result, we will notify you by phone as soon as possible.  Submit refill requests through [employer] or call your pharmacy and they will forward the refill request to us. Please allow 3 business days for your refill to be completed.          Additional Information About Your Visit        [employer] Information     [employer] lets you send messages to your doctor, view your test results, renew your prescriptions, schedule appointments and more. To sign up, go to [email or web address] . Click on \"Log in\" on the left side of the screen, which will take you to the Welcome page. Then click on \"Sign up Now\" on the right side of the page.     You will be asked to enter the access code listed below, as well as some personal information. Please follow the " "directions to create your username and password.     Your access code is: [email or web address]  Expires: 2018  1:35 PM     Your access code will  in 90 days. If you need help or a new code, please call your [place] clinic or [phone].        Care EveryWhere ID     This is your Care EveryWhere ID. This could be used by other organizations to access your [place] medical records  [id number]        Your Vitals Were     Pulse Temperature Respirations Height Pulse Oximetry BMI (Body Mass Index)    117 98.4  F (36.9  C) 20 5' 11\" (1.803 m) 97% 23.85 kg/m2       Blood Pressure from Last 3 Encounters:   18 105/76   18 105/76   18 105/73    Weight from Last 3 Encounters:   18 171 lb (77.6 kg)   18 171 lb (77.6 kg)   18 171 lb (77.6 kg)              Today, you had the following     No orders found for display       Primary Care Provider Fax #    Physician No Ref-Primary [phone]       No address on file        Equal Access to Services     [facility] : Hadii yesica ponceo Ricardo, waaxda luchengadaha, qaybta kaalmada adenicoleda, amira chung . So [facility] [phone].    ATENCIÓN: Si habla español, tiene a medina disposición servicios gratuitos de asistencia lingüística. Llame al [phone].    We comply with applicable federal civil rights laws and [place] laws. We do not discriminate on the basis of race, color, national origin, age, disability, sex, sexual orientation, or gender identity.            Thank you!     Thank you for choosing [facility]  for your care. Our goal is always to provide you with excellent care. Hearing back from our patients is one way we can continue to improve our services. Please take a few minutes to complete the written survey that you may receive in the mail after your visit with us. Thank you!             Your Updated Medication List - Protect others around you: Learn how to safely use, store and throw " away your medicines at www.disposemymeds.org.          This list is accurate as of 5/1/18  1:35 PM.  Always use your most recent med list.                   Brand Name Dispense Instructions for use Diagnosis    acetaminophen-codeine 300-30 MG per tablet    TYLENOL #3     Take 1-2 tablets by mouth every 4 hours as needed for moderate pain        fiber modular packet      1 packet 2 times daily as needed        gabapentin 100 MG capsule    NEURONTIN     Take 100 mg by mouth 2 times daily        melatonin 3 MG tablet      Take 3 mg by mouth At Bedtime        polyethylene glycol powder    MIRALAX/GLYCOLAX     Take 17 g by mouth 2 times daily as needed for constipation        PRAVACHOL 20 MG tablet   Generic drug:  pravastatin      Take 40 mg by mouth At Bedtime

## 2018-05-01 NOTE — PROGRESS NOTES
"[facility]    Chief Complaint   Patient presents with     YADISMITA       [facility] Medical Record Number:  [id number]    HPI:    Cornelius Wolfe is a 57 year old  (1960), who is being seen today for an episodic care visit at [facility].  HPI information obtained from: facility chart records, facility staff and patient report.    Today's concern is:  SAH (subarachnoid hemorrhage) (H)  Status post coil embolization of cerebral aneurysm  Hydrocephalus  S/P ventriculoperitoneal shunt  Patient alert and conversational today. He denies headaches, issues with balance and no noted confusion. Per record review, his baseline cognition has some forgetfulness.     Respiratory failure following trauma and surgery (H)  He is breathing with ease ORA and denies any SOB.  BP Readin-109 / 71-76                 HR:      Oropharyngeal dysphagia  Malnutrition, unspecified type (H)  Admit weight 166lbs  Patient continues to regain weight slowly - BMI WNL              Does receive house supplement BID  Dietary continues to follow  Wt Readings from Last 5 Encounters:   18 171 lb (77.6 kg)   18 171 lb (77.6 kg)   18 171 lb (77.6 kg)   18 171 lb 3.2 oz (77.7 kg)   18 168 lb (76.2 kg)       Chronic deep vein thrombosis (DVT) of distal vein of right lower extremity (H)  Internal jugular (IJ) vein thromboembolism, chronic, right (H  Not anticoagulated due to hx of SAH.     Homeless  Has been going [facility] with family per record review. Verbalized that he was planning to talk with SS today on potential options at discharge.     Neuropathy  Verbalized that he continues to experience \"tingling\" in his hands from his neuropathy. Occasionally states that this discomfort makes it difficult to hold items. 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 Currently on regimen of Gabapentin and PRN Tylenol #3    Adjustment disorder with depressed mood  Physical deconditioning  Mood " "was calm and pleasant today. 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  Completed therapy on 3/23.       ALLERGIES: No known allergies  Past Medical, Surgical, Family and Social History reviewed and updated in [facility].    Current Outpatient Prescriptions   Medication Sig Dispense Refill     acetaminophen-codeine (TYLENOL #3) 300-30 MG per tablet Take 1-2 tablets by mouth every 4 hours as needed for moderate pain       fiber modular (NUTRISOURCE FIBER) packet 1 packet 2 times daily as needed       gabapentin (NEURONTIN) 100 MG capsule Take 100 mg by mouth 2 times daily       melatonin 3 MG tablet Take 3 mg by mouth At Bedtime       polyethylene glycol (MIRALAX/GLYCOLAX) powder Take 17 g by mouth 2 times daily as needed for constipation       pravastatin (PRAVACHOL) 20 MG tablet Take 40 mg by mouth At Bedtime       Medications reviewed:  Medications reconciled to facility chart and changes were made to reflect current medications as identified as above med list. Below are the changes that were made:   Medications stopped since last EPIC medication reconciliation:   There are no discontinued medications.    Medications started since last [facility] medication reconciliation:  No orders of the defined types were placed in this encounter.      REVIEW OF SYSTEMS:  10 point ROS of systems including Constitutional, Eyes, Respiratory, Cardiovascular, Gastroenterology, Genitourinary, Integumentary, Musculoskeletal, Psychiatric were all negative except for pertinent positives noted in my HPI.    Physical Exam:  /76  Pulse 117  Temp 98.4  F (36.9  C)  Resp 20  Ht 5' 11\" (1.803 m)  Wt 171 lb (77.6 kg)  SpO2 97%  BMI 23.85 kg/m2  GENERAL APPEARANCE:  Alert, in no distress, oriented, cooperative  EYES:  EOM, conjunctivae, lids, pupils and irises normal  NECK:  No adenopathy, masses or thyromegaly, trach area healed over  RESP:  respiratory effort and palpation of chest normal, lungs clear " to auscultation, no respiratory distress  CV:  Palpation and auscultation of heart done, regular rate and rhythm, no murmur, rub, or gallop, no edema  ABDOMEN:  normal bowel sounds, soft, nontender, no hepatosplenomegaly or other masses  M/S:   Gait and station normal, able to move all extremities, up independently   SKIN:  Inspection of skin and subcutaneous tissue baseline, Palpation of skin and subcutaneous tissue baseline  PSYCH:  oriented X 3, normal insight, judgement and memory     Recent Labs:   CBC RESULTS:   Recent Labs   Lab Test 03/16/18   WBC  7.9   RBC  3.80*   HGB  12.0*   HCT  37.4*   MCV  98.4   RDW  13.8   PLT  327       Last Basic Metabolic Panel:  Recent Labs   Lab Test 03/16/18   NA  140   POTASSIUM  4.3   CHLORIDE  108   BLUE  9.7   CO2  27   BUN  16   CR  0.87   GLC  88       Assessment/Plan:  SAH (subarachnoid hemorrhage) (H)  Status post coil embolization of cerebral aneurysm  Hydrocephalus  S/P ventriculoperitoneal shunt  Comment: Chronic condition, stable  Plan: Follow up with neurology as scheduled    Respiratory failure following trauma and surgery (H)  Comment: acute condition, resolved   Plan: Monitor VS and resp status PRN    Oropharyngeal dysphagia  Malnutrition, unspecified type (H)  Comment: Chronic condition  Plan: Continue to monitor wts, currently stable. Continue dietary involvement    Chronic deep vein thrombosis (DVT) of distal vein of right lower extremity (H)  Internal jugular (IJ) vein thromboembolism, chronic, right (H  Comment: Acute condition, resolved  Plan: not currently receiving anticoagulation, monitor for reoccurrence    Homeless  Comment: Chronic condition  Plan: Per record review, patient has family involvement and other outside resources. He also is working with facility SS on living arrangements at discharge.     Neuropathy  Comment: Chronic condition, managed  Plan: Stable on current regimen of Neurontin and Tylenol #3    Adjustment disorder with depressed  mood  Physical deconditioning  Comment: Chronic condition  Plan: Declined previous offers of psych services, will observe for changes to mood. Continue with current medication regimen.     Orders:  No new orders.    GENA Bedoya    This patient was seen along with a nurse practitioner student.  The histories and reviews of systems were obtained by student and confirmed by myself. All objective, assessments and plans were completed by myself.   Florinda Becker    Electronically signed by SHANICE Sosa CNP

## 2018-05-10 ENCOUNTER — NURSING HOME VISIT (OUTPATIENT)
Dept: GERIATRICS | Facility: CLINIC | Age: 58
End: 2018-05-10
Payer: COMMERCIAL

## 2018-05-10 VITALS
HEIGHT: 71 IN | TEMPERATURE: 98.1 F | RESPIRATION RATE: 20 BRPM | SYSTOLIC BLOOD PRESSURE: 109 MMHG | WEIGHT: 171.3 LBS | OXYGEN SATURATION: 97 % | DIASTOLIC BLOOD PRESSURE: 77 MMHG | BODY MASS INDEX: 23.98 KG/M2 | HEART RATE: 88 BPM

## 2018-05-10 DIAGNOSIS — Z98.2 S/P VENTRICULOPERITONEAL SHUNT: ICD-10-CM

## 2018-05-10 DIAGNOSIS — R13.12 OROPHARYNGEAL DYSPHAGIA: ICD-10-CM

## 2018-05-10 DIAGNOSIS — E78.5 HYPERLIPIDEMIA, UNSPECIFIED HYPERLIPIDEMIA TYPE: ICD-10-CM

## 2018-05-10 DIAGNOSIS — F43.21 ADJUSTMENT DISORDER WITH DEPRESSED MOOD: ICD-10-CM

## 2018-05-10 DIAGNOSIS — R52 PAIN: ICD-10-CM

## 2018-05-10 DIAGNOSIS — E46 MALNUTRITION, UNSPECIFIED TYPE (H): ICD-10-CM

## 2018-05-10 DIAGNOSIS — R53.81 PHYSICAL DECONDITIONING: ICD-10-CM

## 2018-05-10 DIAGNOSIS — Z59.00 HOMELESS: ICD-10-CM

## 2018-05-10 DIAGNOSIS — Z93.0 STATUS POST TRACHEOSTOMY (H): ICD-10-CM

## 2018-05-10 DIAGNOSIS — J95.821 RESPIRATORY FAILURE FOLLOWING TRAUMA AND SURGERY (H): ICD-10-CM

## 2018-05-10 DIAGNOSIS — K59.01 SLOW TRANSIT CONSTIPATION: ICD-10-CM

## 2018-05-10 DIAGNOSIS — Z98.890 STATUS POST COIL EMBOLIZATION OF CEREBRAL ANEURYSM: ICD-10-CM

## 2018-05-10 DIAGNOSIS — G62.9 NEUROPATHY: ICD-10-CM

## 2018-05-10 DIAGNOSIS — I82.5Z1 CHRONIC DEEP VEIN THROMBOSIS (DVT) OF DISTAL VEIN OF RIGHT LOWER EXTREMITY (H): ICD-10-CM

## 2018-05-10 DIAGNOSIS — I60.9 SAH (SUBARACHNOID HEMORRHAGE) (H): Primary | ICD-10-CM

## 2018-05-10 DIAGNOSIS — G47.01 INSOMNIA DUE TO MEDICAL CONDITION: ICD-10-CM

## 2018-05-10 DIAGNOSIS — J18.9 HAP (HOSPITAL-ACQUIRED PNEUMONIA): ICD-10-CM
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
## 2018-05-10 DIAGNOSIS — G91.9 HYDROCEPHALUS (H): ICD-10-CM

## 2018-05-10 DIAGNOSIS — I82.C21 INTERNAL JUGULAR (IJ) VEIN THROMBOEMBOLISM, CHRONIC, RIGHT (H): ICD-10-CM

## 2018-05-10 PROCEDURE — 99309 SBSQ NF CARE MODERATE MDM 30: CPT | Performed by: NURSE PRACTITIONER

## 2018-05-10 SDOH — ECONOMIC STABILITY - HOUSING INSECURITY: HOMELESSNESS UNSPECIFIED: Z59.00

## 2018-05-10 NOTE — PROGRESS NOTES
[facility]    Chief Complaint   Patient presents with     NIKKI       [facility] Medical Record Number:  [id number]    HPI:    Cornelius Wolfe is a 57 year old  (1960), who is being seen today for an episodic care visit at [facility].  HPI information obtained from: facility chart records, facility staff and patient report.    Today's concern is:  SAH (subarachnoid hemorrhage) (H)  Status post coil embolization of cerebral aneurysm      Patient independent at this point in most ADLs requiring a less intense level of therapies prior to d/c back into the community  Continues with neuropathic pain to hands as noted below  PT/OT Discharged patient on 3/23:    PT: Discharge summary: patient has been seen for 8 skilled PT treatment sessions. Demonstrates the ability to ambulate independently throughout facility without AD. Performs transfers and bed mobility independently.  Patient has been provided with a home exercise program to prevent decline in functional mobility following D/C from PT. Patient  to remainin LTC facility pending placement in halfway. Nofurther skilled therapeutic intervention is indicated at this time.   OT Discharged patient on 3/23:    Functional activity:     CPT shop task 5/6.     Pt successful to complete 10/12 5 and 6 step sequencing tasks independently.    CPT MED BOX- 5.0/6.0    SAFETY-  17 / 18    Functional activity:     Short Blessed score 14/28 suggests significant cognitive impairment, difficulty noted with short term memory and orientation. Discussed compensatory strategies for STM i.e. reminders in phone, help from friends and family. OT administered the SLUMS to pt.  Pt. scored 20/30.  This score suggests dementia cognition.    Pt. scored 22/30 on the MMSE-2.  This score suggests Mild cognitive impairment.    3/23/2018- Last day skilled OT with goals met,    Hydrocephalus  S/P ventriculoperitoneal shunt  Denies headaches or increased difficulties with  "balance; no noted confusion - baseline forgetfulness  Baseline cognitive impairment as noted above    Respiratory failure following trauma and surgery (H)  Status post tracheostomy (H)  HAP (hospital-acquired pneumonia)  Denies cough/SOB/TORRES today  Trach scar healed well w/o difficulty    Oropharyngeal dysphagia    Patient tolerating regular diet without difficulty    Malnutrition, unspecified type (H)  Admit weight 166lbs  Patient continues to regain weight slowly - BMI WNL  Diet as noted above   Does receive house supplement BID  Dietary following  Wt Readings from Last 4 Encounters:   05/10/18 171 lb 4.8 oz (77.701 kg)   04/25/18 171 lb (77.6 kg)   04/18/18 171 lb (77.6 kg)   04/11/18 171 lb 3.2 oz (77.7 kg)     Estimated body mass index is 23.85 kg/(m^2) as calculated from the following:    Height as of this encounter: 5' 11\" (1.803 m).    Weight as of this encounter: 171 lb (77.6 kg).    Chronic deep vein thrombosis (DVT) of distal vein of right lower extremity (H)  Internal jugular (IJ) vein thromboembolism, chronic, right (H)    No current anticoagulation 2/2 hemorrhage as noted above    Homeless  2/2 to extended hospitalization  He states he did have his own apartment prior to this event, but he no longer has this.  He notes that he has been working with his sons at locating a place for him to move following his stay in the facility - He is also working with facility SW    Neuropathy  2/2 to subarachnoid hemorrhage as noted above  Baseline in hands and increases with activity as noted with therapy notes -  He states that it's \"okay\" today and continues to decline the need for adjustment - he does note that the pain doesn't effect his ability to complete daily activities  Currently on regimen of Gabapentin and PRN Tylenol #3    Insomnia due to medical condition  Adjustment disorder with depressed mood  Admitted on regimen of Lexapro, Remeron, Trazodone and Melatonin  He declined both Lexapro and Remeron upon " arrival to the facility, he notes that he is stressed and slightly depressed at times but feels as though he is able to cope with these things at this time without these medications on board  Did speak with him about depression being completely normal following his extensive health concerns since December and assured him that we would be here to work with him through these things and that we also have in-house psychology/psychiatry available to him as he wishes  Does tend to stick to himself, 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 - staff to encourage participation in facility activities  Last PHQ9 (3/20/18): 01/27  Both Melatonin and Trazodone have been d/c'd 2/2 non-use and ineffectiveness  Patient denies ongoing difficulties with sleeping at night    Hyperlipidemia, unspecified hyperlipidemia type  Chronic  Recent Labs   Lab Test 03/16/18   CHOL  124   HDL  48   LDL  56   TRIG  99   On regimen of Pravachol     Slow transit constipation  Patient denies difficulties with constipation today  On regimen of Miralax and Nutrisource fiber    Pain  2/2 above noted diagnoses and hosptializations  Managed on Tylenol #3 and Gabapentin as noted above  Has used Tylenol #3 a total of 1 time in the last 5 days    Physical deconditioning  2/2 above noted diagnoses and hospitalizations  Discharged from therapies as noted above    ALLERGIES: No known allergies  Past Medical, Surgical, Family and Social History reviewed and updated in EPIC.    Current Outpatient Prescriptions   Medication Sig Dispense Refill     acetaminophen-codeine (TYLENOL #3) 300-30 MG per tablet Take 1-2 tablets by mouth every 4 hours as needed for moderate pain       fiber modular (NUTRISOURCE FIBER) packet 1 packet 2 times daily as needed       gabapentin (NEURONTIN) 100 MG capsule Take 100 mg by mouth 2 times daily       melatonin 3 MG tablet Take 3 mg by mouth At Bedtime       polyethylene glycol  "(MIRALAX/GLYCOLAX) powder Take 17 g by mouth 2 times daily as needed for constipation       pravastatin (PRAVACHOL) 20 MG tablet Take 40 mg by mouth At Bedtime       Medications reviewed:  Medications reconciled to facility chart and changes were made to reflect current medications as identified as above med list. Below are the changes that were made:   Medications stopped since last EPIC medication reconciliation:   There are no discontinued medications.    Medications started since last [facility] medication reconciliation:  No orders of the defined types were placed in this encounter.    REVIEW OF SYSTEMS:  4 point ROS including Respiratory, CV, GI and , other than that noted in the HPI,  is negative    Physical Exam:  /77  Pulse 88  Temp 98.1  F (36.7  C)  Resp 20  Ht 5' 11\" (1.803 m)  Wt 171 lb 4.8 oz (77.7 kg)  SpO2 97%  BMI 23.89 kg/m2  GENERAL APPEARANCE:  Alert, in no distress, oriented, thin, somnolent, cooperative, middle-aged male ambulating back to room  ENT:  Mouth and posterior oropharynx normal, moist mucous membranes, normal hearing acuity, poor dentition  EYES:  EOM, conjunctivae, lids, pupils and irises normal, wears glasses  NECK:  No adenopathy, masses or thyromegaly, trach scar present  RESP:  Respiratory effort and palpation of chest normal, lungs clear to auscultation, no respiratory distress  CV:  Palpation and auscultation of heart done, regular rate and rhythm, no murmur, rub, or gallop, no edema, +2 pedal pulses  ABDOMEN: Normal bowel sounds, soft, nontender, no hepatosplenomegaly or other masses  M/S:   Gait and station normal; Digits and nails normal  SKIN:  Inspection of skin and subcutaneous tissue baseline, Palpation of skin and subcutaneous tissue baseline  NEURO:   Cranial nerves 2-12 are normal tested and grossly at patient's baseline  PSYCH:  oriented X 3, normal insight, judgement and memory, affect and mood baseline, More friendly this visit.       BP Reading:          "                   HR:    105/76  105/73  109/71    Recent Labs:   CBC RESULTS:   Recent Labs   Lab Test 03/16/18   WBC  7.9   RBC  3.80*   HGB  12.0*   HCT  37.4*   MCV  98.4   RDW  13.8   PLT  327       Last Basic Metabolic Panel:  Recent Labs   Lab Test 03/16/18   NA  140   POTASSIUM  4.3   CHLORIDE  108   BLUE  9.7   CO2  27   BUN  16   CR  0.87   GLC  88         Assessment/Plan:   Diagnosis Comments   1. SAH (subarachnoid hemorrhage) (H)   Status post coil embolization of cerebral aneurysm  Patient followed closely by Neurology  Discharged from therapies for attaining patient baseline  Stable at this time  F/u with Neurology per their recommendations   2. Hydrocephalus    S/P ventriculoperitoneal shunt     Stable  VSS   3. Respiratory failure following trauma and surgery (H)    Status post tracheostomy (H)    HAP (hospital-acquired pneumonia)     No respiratory concerns at this time  Resolved   4. Oropharyngeal dysphagia Appears to have resolved  Tolerating Regular diet without difficulty   5. Malnutrition, unspecified type (H)  Dietary involvement  Resolved  Patient's BMI now WNL  Continue to monitor patient's weight  Adapt as needed   6. Chronic deep vein thrombosis (DVT) of distal vein of right lower extremity (H)    Internal jugular (IJ) vein thromboembolism, chronic, right (H)     No anticoagulation as noted above  Continue to monitor for s/sx of recurrence  Stable without medical intervention   7. Homeless  Working with sons/SW on placement at this time   8. Neuropathy  Managed on regimen of Gabapentin with good relief  Stable on current regimen; continue medications as currently ordered.   9. Insomnia due to medical condition  Adjustment disorder with depressed mood Declines in-house psych involvement at this time; did reassure their availability to him as he wishes  Was admitted on regimen of Lexapro and Remeron - he has declined these medications since admission to facility; please monitor for  ongoing/increased sx of depression with cessation of this medication  Off of all medications at this time - will monitor for ongoing needs   10. Hyperlipidemia, unspecified hyperlipidemia type  Chronic  Stable on current regimen; continue medications as currently ordered.  Continue Pravachol as ordered   11. Slow transit constipation Stable on current regimen; continue medications as currently ordered.   12. Pain  Stable on current regimen; continue medications as currently ordered.   13. Physical deconditioning  PT/OT completed  Will remain in facility while placement is located     Electronically signed by  SHANICE Markham CNP

## 2018-05-10 NOTE — LETTER
5/10/2018        RE: Cornelius Wolfe  [address] [address] [place] [id number]        [facility]    Chief Complaint   Patient presents with     RECHECK       [facility] [place] Medical Record Number:  [id number]    HPI:    Cornelius Wolfe is a 57 year old  (1960), who is being seen today for an episodic care visit at [facility].  HPI information obtained from: facility chart records, facility staff and patient report.    Today's concern is:  SAH (subarachnoid hemorrhage) (H)  Status post coil embolization of cerebral aneurysm      Patient independent at this point in most ADLs requiring a less intense level of therapies prior to d/c back into the community  Continues with neuropathic pain to hands as noted below  PT/OT Discharged patient on 3/23:    PT: Discharge summary: patient has been seen for 8 skilled PT treatment sessions. Demonstrates the ability to ambulate independently throughout facility without AD. Performs transfers and bed mobility independently.  Patient has been provided with a home exercise program to prevent decline in functional mobility following D/C from PT. Patient  to remainin LTC facility pending placement in [facility]. Nofurther skilled therapeutic intervention is indicated at this time.   OT Discharged patient on 3/23:    Functional activity:     CPT shop task 5/6.     Pt successful to complete 10/12 5 and 6 step sequencing tasks independently.    CPT MED BOX- 5.0/6.0    SAFETY-  17 / 18    Functional activity:     Short Blessed score 14/28 suggests significant cognitive impairment, difficulty noted with short term memory and orientation. Discussed compensatory strategies for STM i.e. reminders in phone, help from friends and family. OT administered the SLUMS to pt.  Pt. scored 20/30.  This score suggests dementia cognition.    Pt. scored 22/30 on the MMSE-2.  This score suggests Mild cognitive impairment.    3/23/2018- Last day skilled OT with goals  "met,    Hydrocephalus  S/P ventriculoperitoneal shunt  Denies headaches or increased difficulties with balance; no noted confusion - baseline forgetfulness  Baseline cognitive impairment as noted above    Respiratory failure following trauma and surgery (H)  Status post tracheostomy (H)  HAP (hospital-acquired pneumonia)  Denies cough/SOB/TORRES today  Trach scar healed well w/o difficulty    Oropharyngeal dysphagia    Patient tolerating regular diet without difficulty    Malnutrition, unspecified type (H)  Admit weight 166lbs  Patient continues to regain weight slowly - BMI WNL  Diet as noted above   Does receive house supplement BID  Dietary following  Wt Readings from Last 4 Encounters:   05/10/18 171 lb 4.8 oz (77.701 kg)   04/25/18 171 lb (77.6 kg)   04/18/18 171 lb (77.6 kg)   04/11/18 171 lb 3.2 oz (77.7 kg)     Estimated body mass index is 23.85 kg/(m^2) as calculated from the following:    Height as of this encounter: 5' 11\" (1.803 m).    Weight as of this encounter: 171 lb (77.6 kg).    Chronic deep vein thrombosis (DVT) of distal vein of right lower extremity (H)  Internal jugular (IJ) vein thromboembolism, chronic, right (H)    No current anticoagulation 2/2 hemorrhage as noted above    Homeless  2/2 to extended hospitalization  He states he did have his own apartment prior to this event, but he no longer has this.  He notes that he has been working with his sons at locating a place for him to move following his stay in the facility - He is also working with facility SW    Neuropathy  2/2 to subarachnoid hemorrhage as noted above  Baseline in hands and increases with activity as noted with therapy notes -  He states that it's \"okay\" today and continues to decline the need for adjustment - he does note that the pain doesn't effect his ability to complete daily activities  Currently on regimen of Gabapentin and PRN Tylenol #3    Insomnia due to medical condition  Adjustment disorder with depressed " mood  Admitted on regimen of Lexapro, Remeron, Trazodone and Melatonin  He declined both Lexapro and Remeron upon arrival to the facility, he notes that he is stressed and slightly depressed at times but feels as though he is able to cope with these things at this time without these medications on board  Did speak with him about depression being completely normal following his extensive health concerns since December and assured him that we would be here to work with him through these things and that we also have in-house psychology/psychiatry available to him as he wishes  Does tend to stick to himself, 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 - staff to encourage participation in facility activities  Last PHQ9 (3/20/18): 01/27  Both Melatonin and Trazodone have been d/c'd 2/2 non-use and ineffectiveness  Patient denies ongoing difficulties with sleeping at night    Hyperlipidemia, unspecified hyperlipidemia type  Chronic  Recent Labs   Lab Test 03/16/18   CHOL  124   HDL  48   LDL  56   TRIG  99   On regimen of Pravachol     Slow transit constipation  Patient denies difficulties with constipation today  On regimen of Miralax and Nutrisource fiber    Pain  2/2 above noted diagnoses and hosptializations  Managed on Tylenol #3 and Gabapentin as noted above  Has used Tylenol #3 a total of 1 time in the last 5 days    Physical deconditioning  2/2 above noted diagnoses and hospitalizations  Discharged from therapies as noted above    ALLERGIES: No known allergies  Past Medical, Surgical, Family and Social History reviewed and updated in [employer].    Current Outpatient Prescriptions   Medication Sig Dispense Refill     acetaminophen-codeine (TYLENOL #3) 300-30 MG per tablet Take 1-2 tablets by mouth every 4 hours as needed for moderate pain       fiber modular (NUTRISOURCE FIBER) packet 1 packet 2 times daily as needed       gabapentin (NEURONTIN) 100 MG capsule Take 100 mg by  "mouth 2 times daily       melatonin 3 MG tablet Take 3 mg by mouth At Bedtime       polyethylene glycol (MIRALAX/GLYCOLAX) powder Take 17 g by mouth 2 times daily as needed for constipation       pravastatin (PRAVACHOL) 20 MG tablet Take 40 mg by mouth At Bedtime       Medications reviewed:  Medications reconciled to facility chart and changes were made to reflect current medications as identified as above med list. Below are the changes that were made:   Medications stopped since last EPIC medication reconciliation:   There are no discontinued medications.    Medications started since last [facility] medication reconciliation:  No orders of the defined types were placed in this encounter.    REVIEW OF SYSTEMS:  4 point ROS including Respiratory, CV, GI and , other than that noted in the HPI,  is negative    Physical Exam:  /77  Pulse 88  Temp 98.1  F (36.7  C)  Resp 20  Ht 5' 11\" (1.803 m)  Wt 171 lb 4.8 oz (77.7 kg)  SpO2 97%  BMI 23.89 kg/m2  GENERAL APPEARANCE:  Alert, in no distress, oriented, thin, somnolent, cooperative, middle-aged male ambulating back to room  ENT:  Mouth and posterior oropharynx normal, moist mucous membranes, normal hearing acuity, poor dentition  EYES:  EOM, conjunctivae, lids, pupils and irises normal, wears glasses  NECK:  No adenopathy, masses or thyromegaly, trach scar present  RESP:  Respiratory effort and palpation of chest normal, lungs clear to auscultation, no respiratory distress  CV:  Palpation and auscultation of heart done, regular rate and rhythm, no murmur, rub, or gallop, no edema, +2 pedal pulses  ABDOMEN: Normal bowel sounds, soft, nontender, no hepatosplenomegaly or other masses  M/S:   Gait and station normal; Digits and nails normal  SKIN:  Inspection of skin and subcutaneous tissue baseline, Palpation of skin and subcutaneous tissue baseline  NEURO:   Cranial nerves 2-12 are normal tested and grossly at patient's baseline  PSYCH:  oriented X 3, normal " insight, judgement and memory, affect and mood baseline, More friendly this visit.       BP Reading:                            HR:    105/76  105/73  109/71    Recent Labs:   CBC RESULTS:   Recent Labs   Lab Test 03/16/18   WBC  7.9   RBC  3.80*   HGB  12.0*   HCT  37.4*   MCV  98.4   RDW  13.8   PLT  327       Last Basic Metabolic Panel:  Recent Labs   Lab Test 03/16/18   NA  140   POTASSIUM  4.3   CHLORIDE  108   BLUE  9.7   CO2  27   BUN  16   CR  0.87   GLC  88         Assessment/Plan:   Diagnosis Comments   1. SAH (subarachnoid hemorrhage) (H)   Status post coil embolization of cerebral aneurysm  Patient followed closely by Neurology  Discharged from therapies for attaining patient baseline  Stable at this time  F/u with Neurology per their recommendations   2. Hydrocephalus    S/P ventriculoperitoneal shunt     Stable  VSS   3. Respiratory failure following trauma and surgery (H)    Status post tracheostomy (H)    HAP (hospital-acquired pneumonia)     No respiratory concerns at this time  Resolved   4. Oropharyngeal dysphagia Appears to have resolved  Tolerating Regular diet without difficulty   5. Malnutrition, unspecified type (H)  Dietary involvement  Resolved  Patient's BMI now WNL  Continue to monitor patient's weight  Adapt as needed   6. Chronic deep vein thrombosis (DVT) of distal vein of right lower extremity (H)    Internal jugular (IJ) vein thromboembolism, chronic, right (H)     No anticoagulation as noted above  Continue to monitor for s/sx of recurrence  Stable without medical intervention   7. Homeless  Working with sons/SW on placement at this time   8. Neuropathy  Managed on regimen of Gabapentin with good relief  Stable on current regimen; continue medications as currently ordered.   9. Insomnia due to medical condition  Adjustment disorder with depressed mood Declines in-house psych involvement at this time; did reassure their availability to him as he wishes  Was admitted on regimen  of Lexapro and Remeron - he has declined these medications since admission to facility; please monitor for ongoing/increased sx of depression with cessation of this medication  Off of all medications at this time - will monitor for ongoing needs   10. Hyperlipidemia, unspecified hyperlipidemia type  Chronic  Stable on current regimen; continue medications as currently ordered.  Continue Pravachol as ordered   11. Slow transit constipation Stable on current regimen; continue medications as currently ordered.   12. Pain  Stable on current regimen; continue medications as currently ordered.   13. Physical deconditioning  PT/OT completed  Will remain in facility while placement is located     Electronically signed by  SHANICE Makrham CNP                    Sincerely,        SHANICE Markham CNP

## 2018-05-14 ENCOUNTER — NURSING HOME VISIT (OUTPATIENT)
Dept: GERIATRICS | Facility: CLINIC | Age: 58
End: 2018-05-14
Payer: COMMERCIAL

## 2018-05-14 VITALS
BODY MASS INDEX: 23.98 KG/M2 | SYSTOLIC BLOOD PRESSURE: 109 MMHG | HEART RATE: 88 BPM | HEIGHT: 71 IN | WEIGHT: 171.3 LBS | OXYGEN SATURATION: 97 % | TEMPERATURE: 98.1 F | DIASTOLIC BLOOD PRESSURE: 77 MMHG | RESPIRATION RATE: 20 BRPM

## 2018-05-14 DIAGNOSIS — G62.1 ALCOHOL-INDUCED POLYNEUROPATHY (H): ICD-10-CM

## 2018-05-14 DIAGNOSIS — E78.5 HYPERLIPIDEMIA, UNSPECIFIED HYPERLIPIDEMIA TYPE: ICD-10-CM

## 2018-05-14 DIAGNOSIS — I60.9 SAH (SUBARACHNOID HEMORRHAGE) (H): Primary | ICD-10-CM

## 2018-05-14 PROCEDURE — 99309 SBSQ NF CARE MODERATE MDM 30: CPT | Performed by: INTERNAL MEDICINE

## 2018-05-14 NOTE — LETTER
5/14/2018        RE: Cornelius Wolfe  [id number] [facility] [id number]        [facility]  Nursing Home Regulatory Visit  May 14, 2018    Chief Complaint   Patient presents with     USP Regulatory       HPI:    Cornelius Wolfe is a 57 year old  (1960), who is being seen today for a federally mandated E/M visit at [facility]. 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 SLUMS 20/30. Is forgetful, but conversant. He is homeless, which is complicating disposition.   2) Peripheral Neuropathy -- in setting of EtOH use. Managed with gabapentin 100 mg BID  3) HLD -- TChol 124, LDL 56, HDL 48 and TG 99 on pravastatin 40 mg qhs    ALLERGIES: No known allergies    PAST MEDICAL HISTORY:   No past medical history on file.    PAST SURGICAL HISTORY:   No past surgical history on file.    FAMILY HISTORY:   No family history on file.    SOCIAL HISTORY:   Lives in a SNF    MEDICATIONS:  Current Outpatient Prescriptions   Medication Sig Dispense Refill     acetaminophen-codeine (TYLENOL #3) 300-30 MG per tablet Take 1-2 tablets by mouth every 4 hours as needed for moderate pain       fiber modular (NUTRISOURCE FIBER) packet 1 packet 2 times daily as needed       gabapentin (NEURONTIN) 100 MG capsule Take 100 mg by mouth 2 times daily       melatonin 3 MG tablet Take 3 mg by mouth At Bedtime       polyethylene glycol (MIRALAX/GLYCOLAX) powder Take 17 g by mouth 2 times daily as needed for constipation       pravastatin (PRAVACHOL) 20 MG tablet Take 40 mg by mouth At Bedtime         Medications reviewed:  Medications reconciled to facility chart and changes were made to reflect current medications as identified as above med list. Below are the changes that were made:   Medications stopped since last EPIC medication reconciliation:   There are no discontinued medications.  Medications started since last EPIC  "medication reconciliation:  No orders of the defined types were placed in this encounter.      Case Management:  I have reviewed the care plan and MDS and do agree with the plan.   Information reviewed:  Medications, vital signs, orders, and nursing notes.    ROS:  4 point ROS neg other than the symptoms noted above in the HPI.    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 SLUMS 20/30. Is forgetful, but conversant. He is homeless, which is complicating disposition.   -- ongoing supportive cares  -- SW following for disposition    2) Peripheral Neuropathy   In setting of EtOH use.   -- continues on gabapentin 100 mg BID    3) HLD  TChol 124, LDL 56, HDL 48 and TG 99   -- continues on pravastatin 40 mg qhs      Electronically signed by:  Aracely Murillo MD      "

## 2018-05-14 NOTE — PROGRESS NOTES
[facility] [facility]  Nursing Home Regulatory Visit  May 14, 2018    Chief Complaint   Patient presents with     skilled nursing Regulatory       HPI:    Cornelius Wolfe is a 57 year old  (1960), who is being seen today for a federally mandated E/M visit at [facility]. 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 SLUMS 20/30. Is forgetful, but conversant. He is homeless, which is complicating disposition.   2) Peripheral Neuropathy -- in setting of EtOH use. Managed with gabapentin 100 mg BID  3) HLD -- TChol 124, LDL 56, HDL 48 and TG 99 on pravastatin 40 mg qhs    ALLERGIES: No known allergies    PAST MEDICAL HISTORY:   No past medical history on file.    PAST SURGICAL HISTORY:   No past surgical history on file.    FAMILY HISTORY:   No family history on file.    SOCIAL HISTORY:   Lives in a SNF    MEDICATIONS:  Current Outpatient Prescriptions   Medication Sig Dispense Refill     acetaminophen-codeine (TYLENOL #3) 300-30 MG per tablet Take 1-2 tablets by mouth every 4 hours as needed for moderate pain       fiber modular (NUTRISOURCE FIBER) packet 1 packet 2 times daily as needed       gabapentin (NEURONTIN) 100 MG capsule Take 100 mg by mouth 2 times daily       melatonin 3 MG tablet Take 3 mg by mouth At Bedtime       polyethylene glycol (MIRALAX/GLYCOLAX) powder Take 17 g by mouth 2 times daily as needed for constipation       pravastatin (PRAVACHOL) 20 MG tablet Take 40 mg by mouth At Bedtime         Medications reviewed:  Medications reconciled to facility chart and changes were made to reflect current medications as identified as above med list. Below are the changes that were made:   Medications stopped since last EPIC medication reconciliation:   There are no discontinued medications.  Medications started since last [facility] medication reconciliation:  No orders of the defined types were placed in this  "encounter.      Case Management:  I have reviewed the care plan and MDS and do agree with the plan.   Information reviewed:  Medications, vital signs, orders, and nursing notes.    ROS:  4 point ROS neg other than the symptoms noted above in the HPI.    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 SLUMS 20/30. Is forgetful, but conversant. He is homeless, which is complicating disposition.   -- ongoing supportive cares  -- SW following for disposition    2) Peripheral Neuropathy   In setting of EtOH use.   -- continues on gabapentin 100 mg BID    3) HLD  TChol 124, LDL 56, HDL 48 and TG 99   -- continues on pravastatin 40 mg qhs      Electronically signed by:  Aracely Murillo MD    "

## 2018-07-19 ENCOUNTER — NURSING HOME VISIT (OUTPATIENT)
Dept: GERIATRICS | Facility: CLINIC | Age: 58
End: 2018-07-19

## 2018-07-19 ENCOUNTER — NURSING HOME VISIT (OUTPATIENT)
Dept: GERIATRICS | Facility: CLINIC | Age: 58
End: 2018-07-19
Payer: COMMERCIAL

## 2018-07-19 VITALS
HEART RATE: 92 BPM | OXYGEN SATURATION: 94 % | DIASTOLIC BLOOD PRESSURE: 80 MMHG | HEIGHT: 71 IN | TEMPERATURE: 97.7 F | SYSTOLIC BLOOD PRESSURE: 118 MMHG | RESPIRATION RATE: 20 BRPM | WEIGHT: 174.6 LBS | BODY MASS INDEX: 24.44 KG/M2

## 2018-07-19 DIAGNOSIS — Z53.9 ERRONEOUS ENCOUNTER--DISREGARD: Primary | ICD-10-CM

## 2018-07-19 DIAGNOSIS — E78.5 HYPERLIPIDEMIA, UNSPECIFIED HYPERLIPIDEMIA TYPE: ICD-10-CM

## 2018-07-19 DIAGNOSIS — I60.9 SAH (SUBARACHNOID HEMORRHAGE) (H): ICD-10-CM

## 2018-07-19 DIAGNOSIS — Z98.890 STATUS POST COIL EMBOLIZATION OF CEREBRAL ANEURYSM: ICD-10-CM

## 2018-07-19 DIAGNOSIS — G91.9 HYDROCEPHALUS (H): ICD-10-CM

## 2018-07-19 DIAGNOSIS — Z98.2 S/P VENTRICULOPERITONEAL SHUNT: ICD-10-CM

## 2018-07-19 DIAGNOSIS — Z01.818 PRE-OP EXAM: Primary | ICD-10-CM

## 2018-07-19 PROCEDURE — 99310 SBSQ NF CARE HIGH MDM 45: CPT | Performed by: NURSE PRACTITIONER

## 2018-07-19 NOTE — PROGRESS NOTES
Encounter opened in error. Please disregard.     SHANICE Markham, DNP  [facility]

## 2018-07-19 NOTE — PROGRESS NOTES
[facility]  [facility] [address]    PRE-OP EVALUATION:    Today's date: 2018    Cornelius Wolfe (: 1960) presents for pre-operative evaluation assessment as requested by Dr. Dover.  Pt requires evaluation and anesthesia risk assessment prior to undergoing surgery/procedure for treatment of Cerebral aneurtsm .  Proposed procedure: Cerebral Angiogram    Patient seen today at [facility] location.    Date of Surgery/ Procedure: 2018  Time of Surgery/ Procedure: 930  Hospital/Surgical Facility: [facility]  Primary Physician: SHANICE Markham, DNP  Type of Anesthesia Anticipated: MAC  History of anesthesia complications: NONE  History of  abnormal bleeding: NONE   History of blood transfusions: Patient unsure  Patient has a Health Care Directive or Living Will:  YES - See POL    PREOP QUESTIONNAIRE  ====================  1- NO - Do you ever have any pain or discomfort in your chest?  2- NO - Have you ever had a severe pain across the front of your chest lasting for half an hour or more?  3- NO - Do you have swelling in your feet or ankles at times?  4- NO - Are you troubled by shortness of breath when: walking on the level/ up a slight hill/ at night?  5- YES - Does your chest ever sound wheezy or whistling?  6- NO - Do you currently have a cold, bronchitis or other respiratory infection?  7- NO - Have you had a cold, bronchitis or other respiratory infection within the last 2 weeks?  8- NO - Do you usually have a cough?  9- NO - Do you sometimes get pains in the calves of your legs when you walk?  10-NO - Do you or anyone in your family have previous history of blood clots?  11-NO - Do you or does anyone in your family have serious bleeding problem such as prolonged bleeding following surgeries or cuts?  12-NO - Have you ever had problems with anemia or been told to take iron pills?  13-NO - Have you had any abnormal blood loss such as black, tarry or bloody  stools, or abnormal vaginal bleeding?  14-NO - Have you or any of your relatives ever had problems with anesthesia?  15-NO - Do you snore or stop breathing at night?  16-NO - Do you have any prosthetic heart valves or joints?  17-NO - Is there any chance that you may be pregnant?    HPI:    Pre-op exam  Pre-op exam requested prior to Cerebral angiogram scheduled for 7/30    SAH (subarachnoid hemorrhage) (H)  Status post coil embolization of cerebral aneurysm      Patient independent at this point in most ADLs requiring a less intense level of therapies prior to d/c back into the community  Continues with neuropathic pain to hands as noted below  PT/OT Discharged patient on 3/23:    PT: Discharge summary: patient has been seen for 8 skilled PT treatment sessions. Demonstrates the ability to ambulate independently throughout facility without AD. Performs transfers and bed mobility independently.  Patient has been provided with a home exercise program to prevent decline in functional mobility following D/C from PT. Patient  to remainin LTC facility pending placement in [facility]. Nofurther skilled therapeutic intervention is indicated at this time.   OT Discharged patient on 3/23:    Functional activity:     CPT shop task 5/6.     Pt successful to complete 10/12 5 and 6 step sequencing tasks independently.    CPT MED BOX- 5.0/6.0    SAFETY-  17 / 18    Functional activity:     Short Blessed score 14/28 suggests significant cognitive impairment, difficulty noted with short term memory and orientation. Discussed compensatory strategies for STM i.e. reminders in phone, help from friends and family. OT administered the SLUMS to pt.  Pt. scored 20/30.  This score suggests dementia cognition.    Pt. scored 22/30 on the MMSE-2.  This score suggests Mild cognitive impairment.    3/23/2018- Last day skilled OT with goals met     Hydrocephalus  S/P ventriculoperitoneal shunt  Denies headaches or increased difficulties with balance; no  noted confusion - baseline forgetfulness  Baseline cognitive impairment as noted above    Hyperlipidemia, unspecified hyperlipidemia type  Chronic  On regimen of Pravachol  Recent Labs   Lab Test 03/16/18   CHOL  124   HDL  48   LDL  56   TRIG  99     Patient Active Problem List    Diagnosis Date Noted     Homeless 04/11/2018     Priority: Medium     Neuropathy 04/11/2018     Priority: Medium     Insomnia due to medical condition 04/11/2018     Priority: Medium     Adjustment disorder with depressed mood 04/11/2018     Priority: Medium     Hyperlipidemia, unspecified hyperlipidemia type 04/11/2018     Priority: Medium     Slow transit constipation 04/11/2018     Priority: Medium     Pain 04/11/2018     Priority: Medium     Physical deconditioning 04/11/2018     Priority: Medium     Internal jugular (IJ) vein thromboembolism, chronic, right (H) 03/27/2018     Priority: Medium     Acute encephalopathy 03/14/2018     Priority: Medium     Chronic deep vein thrombosis (DVT) of distal vein of right lower extremity (H) 03/14/2018     Priority: Medium     Respiratory failure following trauma and surgery (H) 03/14/2018     Priority: Medium     HAP (hospital-acquired pneumonia) 03/14/2018     Priority: Medium     Oropharyngeal dysphagia 03/14/2018     Priority: Medium     Malnutrition, unspecified type (H) 03/14/2018     Priority: Medium     SAH (subarachnoid hemorrhage) (H) 01/26/2018     Priority: Medium     S/P ventriculoperitoneal shunt 01/05/2018     Priority: Medium     Status post tracheostomy (H) 12/29/2017     Priority: Medium     Status post coil embolization of cerebral aneurysm 12/13/2017     Priority: Medium     Hydrocephalus 12/10/2017     Priority: Medium       No past medical history on file.    No past surgical history on file.    No family history on file.    Social History   Substance Use Topics     Smoking status: Not on file     Smokeless tobacco: Not on file     Alcohol use Not on file          Allergies  "  Allergen Reactions     No Known Allergies      Current Outpatient Prescriptions   Medication Sig Dispense Refill     acetaminophen-codeine (TYLENOL #3) 300-30 MG per tablet Take 1-2 tablets by mouth every 4 hours as needed for moderate pain       fiber modular (NUTRISOURCE FIBER) packet 1 packet 2 times daily as needed       gabapentin (NEURONTIN) 100 MG capsule Take 100 mg by mouth 2 times daily       melatonin 3 MG tablet Take 3 mg by mouth At Bedtime       polyethylene glycol (MIRALAX/GLYCOLAX) powder Take 17 g by mouth 2 times daily as needed for constipation       pravastatin (PRAVACHOL) 20 MG tablet Take 40 mg by mouth At Bedtime         REVIEW OF SYSTEMS:   10 point ROS of systems including Constitutional, Eyes, Respiratory, Cardiovascular, Gastroenterology, Genitourinary, Integumentary, Muscularskeletal, Psychiatric were all negative except for pertinent positives noted in my HPI.    EXAM:   /80  Pulse 66  Temp 97.7  F (36.5  C)  Ht 5' 11\" (1.803 m)  Wt 174 lb 9.6 oz (79.2 kg)  SpO2 94%  BMI 24.35 kg/m2  GENERAL APPEARANCE:  Alert, in no distress, oriented, thin, somnolent, cooperative, middle-aged male  ENT:  Mouth and posterior oropharynx normal, moist mucous membranes, normal hearing acuity, poor dentition  EYES:  EOM, conjunctivae, lids, pupils and irises normal, wears glasses  NECK:  No adenopathy, masses or thyromegaly, trach scar present  RESP:  Respiratory effort and palpation of chest normal, lungs clear to auscultation, no respiratory distress  CV:  Palpation and auscultation of heart done, regular rate and rhythm, no murmur, rub, or gallop, no edema, +2 pedal pulses  ABDOMEN: Normal bowel sounds, soft, nontender, no hepatosplenomegaly or other masses  M/S:   Gait and station normal; Digits and nails normal  SKIN:  Inspection of skin and subcutaneous tissue baseline, Palpation of skin and subcutaneous tissue baseline  NEURO:   Cranial nerves 2-12 are normal tested and grossly at " patient's baseline  PSYCH:  oriented X 3, normal insight, judgement and memory, affect and mood baseline, withdrawn at times    DIAGNOSTICS:    Preop Testing   Hgb/Plt:     Crt:     No EKG or CXR required for this procedure    IMPRESSION:   Reason for surgery/procedure: Cerebral aneurysm  Diagnosis/reason for consult: Cerebral angiogram    The proposed surgical procedure is considered INTERMEDIATE risk.    For above listed surgery and anesthesia:   Patient is at MODERATE risk for surgery/procedure and perioperative/procedure complications.    RECOMMENDATIONS:     --Approval given to proceed with proposed procedure, without further diagnostic evaluation  --Patient is to take all other scheduled medications on the day of surgery       Signed Electronically by: Rosalee Camacho

## 2018-07-19 NOTE — LETTER
2018        RE: Cornelius Wolfe  [address] [address] [place] [id number]        [facility] SERVICES  [facility] [id number]    PRE-OP EVALUATION:    Today's date: 2018    Cornelius Wolfe (: 1960) presents for pre-operative evaluation assessment as requested by Dr. Dover.  Pt requires evaluation and anesthesia risk assessment prior to undergoing surgery/procedure for treatment of Cerebral aneurtsm .  Proposed procedure: Cerebral Angiogram    Patient seen today at [facility] location.    Date of Surgery/ Procedure: 2018  Time of Surgery/ Procedure: 930  Hospital/Surgical Facility: [facility]  Primary Physician: SHANICE Markham, DNP  Type of Anesthesia Anticipated: MAC  History of anesthesia complications: NONE  History of  abnormal bleeding: NONE   History of blood transfusions: Patient unsure  Patient has a Health Care Directive or Living Will:  YES - See POL    PREOP QUESTIONNAIRE  ====================  1- NO - Do you ever have any pain or discomfort in your chest?  2- NO - Have you ever had a severe pain across the front of your chest lasting for half an hour or more?  3- NO - Do you have swelling in your feet or ankles at times?  4- NO - Are you troubled by shortness of breath when: walking on the level/ up a slight hill/ at night?  5- YES - Does your chest ever sound wheezy or whistling?  6- NO - Do you currently have a cold, bronchitis or other respiratory infection?  7- NO - Have you had a cold, bronchitis or other respiratory infection within the last 2 weeks?  8- NO - Do you usually have a cough?  9- NO - Do you sometimes get pains in the calves of your legs when you walk?  10-NO - Do you or anyone in your family have previous history of blood clots?  11-NO - Do you or does anyone in your family have serious bleeding problem such as prolonged bleeding following surgeries or cuts?  12-NO - Have you ever had problems with anemia or been told to take iron  pills?  13-NO - Have you had any abnormal blood loss such as black, tarry or bloody stools, or abnormal vaginal bleeding?  14-NO - Have you or any of your relatives ever had problems with anesthesia?  15-NO - Do you snore or stop breathing at night?  16-NO - Do you have any prosthetic heart valves or joints?  17-NO - Is there any chance that you may be pregnant?    HPI:    Pre-op exam  Pre-op exam requested prior to Cerebral angiogram scheduled for 7/30    SAH (subarachnoid hemorrhage) (H)  Status post coil embolization of cerebral aneurysm      Patient independent at this point in most ADLs requiring a less intense level of therapies prior to d/c back into the community  Continues with neuropathic pain to hands as noted below  PT/OT Discharged patient on 3/23:    PT: Discharge summary: patient has been seen for 8 skilled PT treatment sessions. Demonstrates the ability to ambulate independently throughout facility without AD. Performs transfers and bed mobility independently.  Patient has been provided with a home exercise program to prevent decline in functional mobility following D/C from PT. Patient  to remainin LTC facility pending placement in detention. Nofurther skilled therapeutic intervention is indicated at this time.   OT Discharged patient on 3/23:    Functional activity:     CPT shop task 5/6.     Pt successful to complete 10/12 5 and 6 step sequencing tasks independently.    CPT MED BOX- 5.0/6.0    SAFETY-  17 / 18    Functional activity:     Short Blessed score 14/28 suggests significant cognitive impairment, difficulty noted with short term memory and orientation. Discussed compensatory strategies for STM i.e. reminders in phone, help from friends and family. OT administered the SLUMS to pt.  Pt. scored 20/30.  This score suggests dementia cognition.    Pt. scored 22/30 on the MMSE-2.  This score suggests Mild cognitive impairment.    3/23/2018- Last day skilled OT with goals met     Hydrocephalus  S/P  ventriculoperitoneal shunt  Denies headaches or increased difficulties with balance; no noted confusion - baseline forgetfulness  Baseline cognitive impairment as noted above    Hyperlipidemia, unspecified hyperlipidemia type  Chronic  On regimen of Pravachol  Recent Labs   Lab Test 03/16/18   CHOL  124   HDL  48   LDL  56   TRIG  99     Patient Active Problem List    Diagnosis Date Noted     Homeless 04/11/2018     Priority: Medium     Neuropathy 04/11/2018     Priority: Medium     Insomnia due to medical condition 04/11/2018     Priority: Medium     Adjustment disorder with depressed mood 04/11/2018     Priority: Medium     Hyperlipidemia, unspecified hyperlipidemia type 04/11/2018     Priority: Medium     Slow transit constipation 04/11/2018     Priority: Medium     Pain 04/11/2018     Priority: Medium     Physical deconditioning 04/11/2018     Priority: Medium     Internal jugular (IJ) vein thromboembolism, chronic, right (H) 03/27/2018     Priority: Medium     Acute encephalopathy 03/14/2018     Priority: Medium     Chronic deep vein thrombosis (DVT) of distal vein of right lower extremity (H) 03/14/2018     Priority: Medium     Respiratory failure following trauma and surgery (H) 03/14/2018     Priority: Medium     HAP (hospital-acquired pneumonia) 03/14/2018     Priority: Medium     Oropharyngeal dysphagia 03/14/2018     Priority: Medium     Malnutrition, unspecified type (H) 03/14/2018     Priority: Medium     SAH (subarachnoid hemorrhage) (H) 01/26/2018     Priority: Medium     S/P ventriculoperitoneal shunt 01/05/2018     Priority: Medium     Status post tracheostomy (H) 12/29/2017     Priority: Medium     Status post coil embolization of cerebral aneurysm 12/13/2017     Priority: Medium     Hydrocephalus 12/10/2017     Priority: Medium       No past medical history on file.    No past surgical history on file.    No family history on file.    Social History   Substance Use Topics     Smoking status: Not  "on file     Smokeless tobacco: Not on file     Alcohol use Not on file          Allergies   Allergen Reactions     No Known Allergies      Current Outpatient Prescriptions   Medication Sig Dispense Refill     acetaminophen-codeine (TYLENOL #3) 300-30 MG per tablet Take 1-2 tablets by mouth every 4 hours as needed for moderate pain       fiber modular (NUTRISOURCE FIBER) packet 1 packet 2 times daily as needed       gabapentin (NEURONTIN) 100 MG capsule Take 100 mg by mouth 2 times daily       melatonin 3 MG tablet Take 3 mg by mouth At Bedtime       polyethylene glycol (MIRALAX/GLYCOLAX) powder Take 17 g by mouth 2 times daily as needed for constipation       pravastatin (PRAVACHOL) 20 MG tablet Take 40 mg by mouth At Bedtime         REVIEW OF SYSTEMS:   10 point ROS of systems including Constitutional, Eyes, Respiratory, Cardiovascular, Gastroenterology, Genitourinary, Integumentary, Muscularskeletal, Psychiatric were all negative except for pertinent positives noted in my HPI.    EXAM:   /80  Pulse 66  Temp 97.7  F (36.5  C)  Ht 5' 11\" (1.803 m)  Wt 174 lb 9.6 oz (79.2 kg)  SpO2 94%  BMI 24.35 kg/m2  GENERAL APPEARANCE:  Alert, in no distress, oriented, thin, somnolent, cooperative, middle-aged male  ENT:  Mouth and posterior oropharynx normal, moist mucous membranes, normal hearing acuity, poor dentition  EYES:  EOM, conjunctivae, lids, pupils and irises normal, wears glasses  NECK:  No adenopathy, masses or thyromegaly, trach scar present  RESP:  Respiratory effort and palpation of chest normal, lungs clear to auscultation, no respiratory distress  CV:  Palpation and auscultation of heart done, regular rate and rhythm, no murmur, rub, or gallop, no edema, +2 pedal pulses  ABDOMEN: Normal bowel sounds, soft, nontender, no hepatosplenomegaly or other masses  M/S:   Gait and station normal; Digits and nails normal  SKIN:  Inspection of skin and subcutaneous tissue baseline, Palpation of skin and " subcutaneous tissue baseline  NEURO:   Cranial nerves 2-12 are normal tested and grossly at patient's baseline  PSYCH:  oriented X 3, normal insight, judgement and memory, affect and mood baseline, withdrawn at times    DIAGNOSTICS:    Preop Testing   Hgb/Plt:     Crt:     No EKG or CXR required for this procedure    IMPRESSION:   Reason for surgery/procedure: Cerebral aneurysm  Diagnosis/reason for consult: Cerebral angiogram    The proposed surgical procedure is considered INTERMEDIATE risk.    For above listed surgery and anesthesia:   Patient is at MODERATE risk for surgery/procedure and perioperative/procedure complications.    RECOMMENDATIONS:     --Approval given to proceed with proposed procedure, without further diagnostic evaluation  --Patient is to take all other scheduled medications on the day of surgery       Signed Electronically by: Rosalee Camacho      Sincerely,        SHANICE Markham CNP

## 2018-07-20 ENCOUNTER — TRANSFERRED RECORDS (OUTPATIENT)
Dept: HEALTH INFORMATION MANAGEMENT | Facility: CLINIC | Age: 58
End: 2018-07-20

## 2018-07-20 LAB
CREAT SERPL-MCNC: 0.85 MG/DL (ref 0.73–1.18)
GFR SERPL CREATININE-BSD FRML MDRD: >60 ML/MIN/1.73M2

## 2018-07-23 ENCOUNTER — TRANSFERRED RECORDS (OUTPATIENT)
Dept: HEALTH INFORMATION MANAGEMENT | Facility: CLINIC | Age: 58
End: 2018-07-23

## 2018-07-23 VITALS
HEART RATE: 66 BPM | BODY MASS INDEX: 24.44 KG/M2 | DIASTOLIC BLOOD PRESSURE: 80 MMHG | OXYGEN SATURATION: 94 % | WEIGHT: 174.6 LBS | HEIGHT: 71 IN | SYSTOLIC BLOOD PRESSURE: 118 MMHG | TEMPERATURE: 97.7 F

## 2018-07-23 LAB
HEMOGLOBIN: 15.3 G/DL (ref 13.4–17.5)
PLATELET # BLD AUTO: 326 K/CMM (ref 140–450)

## 2018-07-24 ENCOUNTER — DOCUMENTATION ONLY (OUTPATIENT)
Dept: OTHER | Facility: CLINIC | Age: 58
End: 2018-07-24

## 2018-07-25 VITALS
TEMPERATURE: 97.7 F | BODY MASS INDEX: 24.44 KG/M2 | HEIGHT: 71 IN | OXYGEN SATURATION: 94 % | WEIGHT: 174.6 LBS | RESPIRATION RATE: 20 BRPM | HEART RATE: 92 BPM | DIASTOLIC BLOOD PRESSURE: 80 MMHG | SYSTOLIC BLOOD PRESSURE: 118 MMHG

## 2018-07-26 ENCOUNTER — DISCHARGE SUMMARY NURSING HOME (OUTPATIENT)
Dept: GERIATRICS | Facility: CLINIC | Age: 58
End: 2018-07-26
Payer: COMMERCIAL

## 2018-07-26 DIAGNOSIS — G47.01 INSOMNIA DUE TO MEDICAL CONDITION: ICD-10-CM

## 2018-07-26 DIAGNOSIS — E46 MALNUTRITION, UNSPECIFIED TYPE (H): ICD-10-CM

## 2018-07-26 DIAGNOSIS — Z98.2 S/P VENTRICULOPERITONEAL SHUNT: ICD-10-CM

## 2018-07-26 DIAGNOSIS — G91.9 HYDROCEPHALUS (H): ICD-10-CM

## 2018-07-26 DIAGNOSIS — I60.9 SAH (SUBARACHNOID HEMORRHAGE) (H): Primary | ICD-10-CM

## 2018-07-26 DIAGNOSIS — R52 PAIN: ICD-10-CM

## 2018-07-26 DIAGNOSIS — F43.21 ADJUSTMENT DISORDER WITH DEPRESSED MOOD: ICD-10-CM

## 2018-07-26 DIAGNOSIS — R53.81 PHYSICAL DECONDITIONING: ICD-10-CM

## 2018-07-26 DIAGNOSIS — Z59.00 HOMELESS: ICD-10-CM

## 2018-07-26 DIAGNOSIS — K59.01 SLOW TRANSIT CONSTIPATION: ICD-10-CM

## 2018-07-26 DIAGNOSIS — I82.5Z1 CHRONIC DEEP VEIN THROMBOSIS (DVT) OF DISTAL VEIN OF RIGHT LOWER EXTREMITY (H): ICD-10-CM

## 2018-07-26 DIAGNOSIS — J95.821 RESPIRATORY FAILURE FOLLOWING TRAUMA AND SURGERY (H): ICD-10-CM

## 2018-07-26 DIAGNOSIS — R13.12 OROPHARYNGEAL DYSPHAGIA: ICD-10-CM

## 2018-07-26 DIAGNOSIS — J18.9 HAP (HOSPITAL-ACQUIRED PNEUMONIA): ICD-10-CM

## 2018-07-26 DIAGNOSIS — Z93.0 STATUS POST TRACHEOSTOMY (H): ICD-10-CM

## 2018-07-26 DIAGNOSIS — G62.9 NEUROPATHY: ICD-10-CM

## 2018-07-26 DIAGNOSIS — Z98.890 STATUS POST COIL EMBOLIZATION OF CEREBRAL ANEURYSM: ICD-10-CM

## 2018-07-26 DIAGNOSIS — E78.5 HYPERLIPIDEMIA, UNSPECIFIED HYPERLIPIDEMIA TYPE: ICD-10-CM

## 2018-07-26 DIAGNOSIS — I82.C21 INTERNAL JUGULAR (IJ) VEIN THROMBOEMBOLISM, CHRONIC, RIGHT (H): ICD-10-CM
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
## 2018-07-26 PROCEDURE — 99316 NF DSCHRG MGMT 30 MIN+: CPT | Performed by: NURSE PRACTITIONER

## 2018-07-26 SDOH — ECONOMIC STABILITY - HOUSING INSECURITY: HOMELESSNESS UNSPECIFIED: Z59.00

## 2018-07-26 NOTE — PROGRESS NOTES
[facility] DISCHARGE SUMMARY    PATIENT'S NAME: Cornelius Wolfe  YOB: 1960  MEDICAL RECORD NUMBER:  [id number]    PRIMARY CARE PROVIDER AND CLINIC RESPONSIBLE AFTER TRANSFER: No Ref-Primary, Physician      CODE STATUS/ADVANCE DIRECTIVES DISCUSSION:   CPR/Full code        Allergies   Allergen Reactions     No Known Allergies        TRANSFERRING PROVIDERS: SHANICE Markham CNP, Aracely Murillo MD  DATE OF SNF ADMISSION:  March / 13 / 2018  DATE OF SNF (anticipated) DISCHARGE: August / 01 / 2018  DISCHARGE DISPOSITION: [facility] Provider   Nursing Facility: [facility] stay 1/25/2018 to 3/13/2018.     Condition on Discharge:  Stable.  Function:  Independent with all ADLs  Cognitive Scores:    CPT shop task 5/6.     Pt successful to complete 10/12 5 and 6 step sequencing tasks independently.    CPT MED BOX- 5.0/6.0    SAFETY-  17 / 18    Short Blessed score 14/28    SLUMS 20/30    22/30 on the MMSE-2  Equipment: no aids    DISCHARGE DIAGNOSIS:   1. SAH (subarachnoid hemorrhage) (H)    2. Status post coil embolization of cerebral aneurysm    3. Hydrocephalus    4. S/P ventriculoperitoneal shunt    5. Respiratory failure following trauma and surgery (H)    6. Status post tracheostomy (H)    7. HAP (hospital-acquired pneumonia)    8. Oropharyngeal dysphagia    9. Malnutrition, unspecified type (H)    10. Chronic deep vein thrombosis (DVT) of distal vein of right lower extremity (H)    11. Internal jugular (IJ) vein thromboembolism, chronic, right (H)    12. Homeless    13. Neuropathy    14. Insomnia due to medical condition    15. Adjustment disorder with depressed mood    16. Hyperlipidemia, unspecified hyperlipidemia type    17. Slow transit constipation    18. Pain    19. Physical deconditioning      HPI Nursing Facility Course:  HPI information obtained from: facility chart records, facility staff, patient report and [facility] chart  review.  SAH (subarachnoid hemorrhage) (H)  Status post coil embolization of cerebral aneurysm      Patient independent at this point in most ADLs requiring a less intense level of therapies prior to d/c back into the community  Continues with neuropathic pain to hands as noted below  PT/OT Discharged patient on 3/23:    PT: Discharge summary: patient has been seen for 8 skilled PT treatment sessions. Demonstrates the ability to ambulate independently throughout facility without AD. Performs transfers and bed mobility independently.  Patient has been provided with a home exercise program to prevent decline in functional mobility following D/C from PT. Patient  to remainin LTC facility pending placement in [facility]. Nofurther skilled therapeutic intervention is indicated at this time.   OT Discharged patient on 3/23:    Functional activity:     CPT shop task 5/6.     Pt successful to complete 10/12 5 and 6 step sequencing tasks independently.    CPT MED BOX- 5.0/6.0    SAFETY-  17 / 18    Functional activity:     Short Blessed score 14/28 suggests significant cognitive impairment, difficulty noted with short term memory and orientation. Discussed compensatory strategies for STM i.e. reminders in phone, help from friends and family. OT administered the SLUMS to pt.  Pt. scored 20/30.  This score suggests dementia cognition.    Pt. scored 22/30 on the MMSE-2.  This score suggests Mild cognitive impairment.    3/23/2018- Last day skilled OT with goals met  Patient to undergo Cerebral angiogram on 7/30 then Discharge home to his father's home as he continues his search for a place of his own.  No active medication regimen at this time  F/u with PCP/Neurology for ongoing monitoring and managment    Hydrocephalus  S/P ventriculoperitoneal shunt  Denies headaches or increased difficulties with balance; no noted confusion - baseline forgetfulness  Baseline cognitive impairment as noted above  F/u with PCP/Neurology for ongoing  "monitoring and management    Respiratory failure following trauma and surgery (H)  Status post tracheostomy (H)  HAP (hospital-acquired pneumonia)  Denies cough/SOB/TORRES today  Trach scar healed well w/o difficulty  No ongoing respiratory concerns  Stable without medical intervention  F/u with PCP for ongoing monitoring and future interventions as needed    Oropharyngeal dysphagia    Patient tolerating regular diet without difficulty  Stable without medical intervention  F/u with PCP for ongoing monitoring and future interventions as needed    Malnutrition, unspecified type (H)  Admit weight 166lbs  Patient continues to regain weight slowly - BMI WNL  Diet as noted above   Does receive house supplement BID  Dietary following  Wt Readings from Last 4 Encounters:   07/25/18 174 lb 9.6 oz (79.2 kg)   07/19/18 174 lb 9.6 oz (79.2 kg)   07/19/18 174 lb 9.6 oz (79.2 kg)   05/14/18 171 lb 4.8 oz (77.7 kg)   Estimated body mass index is 24.35 kg/(m^2) as calculated from the following:    Height as of this encounter: 5' 11\" (1.803 m).    Weight as of this encounter: 174 lb 9.6 oz (79.2 kg).  Stable without medical intervention  F/u with PCP for ongoing monitoring    Chronic deep vein thrombosis (DVT) of distal vein of right lower extremity (H)  Internal jugular (IJ) vein thromboembolism, chronic, right (H)    No current anticoagulation 2/2 hemorrhage as noted above  Stable without medical intervention  F/u with PCP for ongoing monitoring and future interventions as needed    Homeless  2/2 to extended hospitalization  He states he did have his own apartment prior to this event, but he no longer has this.  Current plan as noted above to discharge to his father's home with his ongoing wish to attain a home of his own in the near future - he tells me he has a place he may be able to get into sooner than later  F/u with PCP/Community SW for ongoing needs    Neuropathy  2/2 to subarachnoid hemorrhage as noted above  Patient states " pain is well controlled on regimen   Currently on regimen of Gabapentin - Tylenol #3 prescribed also, patient has used this minimally. He will discharge home with remaining tablets on hand - will not discharge with additional prescription.   Stable without medical intervention  F/u with PCP for ongoing pain management needs    Insomnia due to medical condition  Adjustment disorder with depressed mood  Admitted on regimen of Lexapro, Remeron, Trazodone and Melatonin  He declined both Lexapro and Remeron upon arrival to the facility, he notes that he is stressed and slightly depressed at times but feels as though he is able to cope with these things at this time without these medications on board  Does tend to stick to himself, 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 - staff to encourage participation in facility activities  Last PHQ9 (3/20/18): 02/27  Both Melatonin and Trazodone have been d/c'd 2/2 non-use and ineffectiveness; Remeron and Lexapro discontinued upon admission  Patient denies ongoing difficulties with sleeping at night  Stable on current regimen; continue medications as currently ordered.  F/u with PCP for ongoing monitoring and management    Hyperlipidemia, unspecified hyperlipidemia type  Chronic  Recent Labs   Lab Test 03/16/18   CHOL  124   HDL  48   LDL  56   TRIG  99   On regimen of Pravachol   Stable on current regimen; continue medications as currently ordered.  F/u with PCP for ongoing monitoring and management    Slow transit constipation  Patient denies difficulties with constipation today  On regimen of Miralax and Nutrisource fiber  Stable on current regimen; continue medications as currently ordered.  F/u with PCP for ongoing monitoring and management    Pain  2/2 above noted diagnoses and hosptializations  Managed on Tylenol #3 and Gabapentin as noted above  Discharge medication plan as above  Stable on current regimen; continue  "medications as currently ordered.  F/u with PCP for ongoing monitoring and management    Physical deconditioning  2/2 above noted diagnoses and hospitalizations  Discharged from therapies as noted above  Will not resume therapies upon discharge  Resolved  F/u with PCP for ongoing monitoring and future interventions as needed    PAST MEDICAL HISTORY:  has no past medical history on file.    DISCHARGE MEDICATIONS:  Current Outpatient Prescriptions   Medication Sig Dispense Refill     acetaminophen-codeine (TYLENOL #3) 300-30 MG per tablet Take 1-2 tablets by mouth every 4 hours as needed for moderate pain       fiber modular (NUTRISOURCE FIBER) packet 1 packet 2 times daily as needed       gabapentin (NEURONTIN) 100 MG capsule Take 100 mg by mouth 2 times daily       polyethylene glycol (MIRALAX/GLYCOLAX) powder Take 17 g by mouth 2 times daily as needed for constipation       pravastatin (PRAVACHOL) 20 MG tablet Take 40 mg by mouth At Bedtime         MEDICATION CHANGES/RATIONALE:   No medication changes while in facility  Controlled medications sent with patient:   Medication: Tylenol #3 , Remaining tabs under patient's name in facility posession tabs given to patient at the time of discharge to take home     ROS:    10 point ROS of systems including Constitutional, Eyes, Respiratory, Cardiovascular, Gastroenterology, Genitourinary, Integumentary, Muscularskeletal, Psychiatric were all negative except for pertinent positives noted in my HPI.    Physical Exam:   Vitals: /80  Pulse 92  Temp 97.7  F (36.5  C)  Resp 20  Ht 5' 11\" (1.803 m)  Wt 174 lb 9.6 oz (79.2 kg)  SpO2 94%  BMI 24.35 kg/m2  BMI= Body mass index is 24.35 kg/(m^2).  GENERAL APPEARANCE:  Alert, in no distress, oriented, thin, somnolent, cooperative, middle-aged male ambulating in the hallway  ENT:  Mouth and posterior oropharynx normal, moist mucous membranes, normal hearing acuity, poor dentition  EYES:  EOM, conjunctivae, lids, pupils and " irises normal, wears glasses  NECK:  No adenopathy, masses or thyromegaly, trach scar present  RESP:  Respiratory effort and palpation of chest normal, lungs clear to auscultation, no respiratory distress  CV:  Palpation and auscultation of heart done, regular rate and rhythm, no murmur, rub, or gallop, no edema, +2 pedal pulses  ABDOMEN: Normal bowel sounds, soft, nontender, no hepatosplenomegaly or other masses  M/S:   Gait and station normal; Digits and nails normal  SKIN:  Inspection of skin and subcutaneous tissue baseline, Palpation of skin and subcutaneous tissue baseline  NEURO:   Cranial nerves 2-12 are normal tested and grossly at patient's baseline  PSYCH:  oriented X 3, normal insight, judgement and memory, affect and mood baseline, More friendly this visit.       BP Readings from Last 3 Encounters:   07/25/18 118/80   07/19/18 118/80   07/19/18 118/80     Pulse Readings from Last 4 Encounters:   07/25/18 92   07/19/18 92   07/19/18 66   05/14/18 88     DISCHARGE PLAN:  Home independent with no services  Patient instructed to follow-up with:  PCP in 7-10 days       [facility] scheduled appointments: None  MTM referral needed and placed by this provider: No    Pending labs: None  SNF labs   CBC RESULTS:   Recent Labs   Lab Test 07/23/18 03/16/18   WBC   --   7.9   RBC   --   3.80*   HGB  15.3  12.0*   HCT   --   37.4*   MCV   --   98.4   RDW   --   13.8   PLT  326  327     Last Basic Metabolic Panel:  Recent Labs   Lab Test 07/20/18 03/16/18   NA   --   140   POTASSIUM   --   4.3   CHLORIDE   --   108   BLUE   --   9.7   CO2   --   27   BUN   --   16   CR  0.85  0.87   GLC   --   88       Discharge Treatments: Nonw    TOTAL DISCHARGE TIME:   Greater than 30 minutes  Electronically signed by:  SHANICE Markham CNP

## 2018-07-26 NOTE — LETTER
7/26/2018        RE: Cornelius Wolfe  [address] [facility] [address]          [place] [facility] DISCHARGE SUMMARY    PATIENT'S NAME: Cornelius Wolfe  YOB: 1960  MEDICAL RECORD NUMBER:  [id number]    PRIMARY CARE PROVIDER AND CLINIC RESPONSIBLE AFTER TRANSFER: No Ref-Primary, Physician      CODE STATUS/ADVANCE DIRECTIVES DISCUSSION:   CPR/Full code        Allergies   Allergen Reactions     No Known Allergies        TRANSFERRING PROVIDERS: SHANICE Markham CNP, Aracely Murillo MD  DATE OF SNF ADMISSION:  March / 13 / 2018  DATE OF SNF (anticipated) DISCHARGE: August / 01 / 2018  DISCHARGE DISPOSITION: [facility] Provider   Nursing Facility: [facility] Hospital stay 1/25/2018 to 3/13/2018.     Condition on Discharge:  Stable.  Function:  Independent with all ADLs  Cognitive Scores:    CPT shop task 5/6.     Pt successful to complete 10/12 5 and 6 step sequencing tasks independently.    CPT MED BOX- 5.0/6.0    SAFETY-  17 / 18    Short Blessed score 14/28    SLUMS 20/30    22/30 on the MMSE-2  Equipment: no aids    DISCHARGE DIAGNOSIS:   1. SAH (subarachnoid hemorrhage) (H)    2. Status post coil embolization of cerebral aneurysm    3. Hydrocephalus    4. S/P ventriculoperitoneal shunt    5. Respiratory failure following trauma and surgery (H)    6. Status post tracheostomy (H)    7. HAP (hospital-acquired pneumonia)    8. Oropharyngeal dysphagia    9. Malnutrition, unspecified type (H)    10. Chronic deep vein thrombosis (DVT) of distal vein of right lower extremity (H)    11. Internal jugular (IJ) vein thromboembolism, chronic, right (H)    12. Homeless    13. Neuropathy    14. Insomnia due to medical condition    15. Adjustment disorder with depressed mood    16. Hyperlipidemia, unspecified hyperlipidemia type    17. Slow transit constipation    18. Pain    19. Physical deconditioning      HPI Nursing Facility Course:  HPI information obtained  from: facility chart records, facility staff, patient report and [facility] chart review.  SAH (subarachnoid hemorrhage) (H)  Status post coil embolization of cerebral aneurysm      Patient independent at this point in most ADLs requiring a less intense level of therapies prior to d/c back into the community  Continues with neuropathic pain to hands as noted below  PT/OT Discharged patient on 3/23:    PT: Discharge summary: patient has been seen for 8 skilled PT treatment sessions. Demonstrates the ability to ambulate independently throughout facility without AD. Performs transfers and bed mobility independently.  Patient has been provided with a home exercise program to prevent decline in functional mobility following D/C from PT. Patient  to remainin LTC facility pending placement in SNO. Nofurther skilled therapeutic intervention is indicated at this time.   OT Discharged patient on 3/23:    Functional activity:     CPT shop task 5/6.     Pt successful to complete 10/12 5 and 6 step sequencing tasks independently.    CPT MED BOX- 5.0/6.0    SAFETY-  17 / 18    Functional activity:     Short Blessed score 14/28 suggests significant cognitive impairment, difficulty noted with short term memory and orientation. Discussed compensatory strategies for STM i.e. reminders in phone, help from friends and family. OT administered the SLUMS to pt.  Pt. scored 20/30.  This score suggests dementia cognition.    Pt. scored 22/30 on the MMSE-2.  This score suggests Mild cognitive impairment.    3/23/2018- Last day skilled OT with goals met  Patient to undergo Cerebral angiogram on 7/30 then Discharge home to his father's home as he continues his search for a place of his own.  No active medication regimen at this time  F/u with PCP/Neurology for ongoing monitoring and managment    Hydrocephalus  S/P ventriculoperitoneal shunt  Denies headaches or increased difficulties with balance; no noted confusion - baseline  "forgetfulness  Baseline cognitive impairment as noted above  F/u with PCP/Neurology for ongoing monitoring and management    Respiratory failure following trauma and surgery (H)  Status post tracheostomy (H)  HAP (hospital-acquired pneumonia)  Denies cough/SOB/TORRES today  Trach scar healed well w/o difficulty  No ongoing respiratory concerns  Stable without medical intervention  F/u with PCP for ongoing monitoring and future interventions as needed    Oropharyngeal dysphagia    Patient tolerating regular diet without difficulty  Stable without medical intervention  F/u with PCP for ongoing monitoring and future interventions as needed    Malnutrition, unspecified type (H)  Admit weight 166lbs  Patient continues to regain weight slowly - BMI WNL  Diet as noted above   Does receive house supplement BID  Dietary following  Wt Readings from Last 4 Encounters:   07/25/18 174 lb 9.6 oz (79.2 kg)   07/19/18 174 lb 9.6 oz (79.2 kg)   07/19/18 174 lb 9.6 oz (79.2 kg)   05/14/18 171 lb 4.8 oz (77.7 kg)   Estimated body mass index is 24.35 kg/(m^2) as calculated from the following:    Height as of this encounter: 5' 11\" (1.803 m).    Weight as of this encounter: 174 lb 9.6 oz (79.2 kg).  Stable without medical intervention  F/u with PCP for ongoing monitoring    Chronic deep vein thrombosis (DVT) of distal vein of right lower extremity (H)  Internal jugular (IJ) vein thromboembolism, chronic, right (H)    No current anticoagulation 2/2 hemorrhage as noted above  Stable without medical intervention  F/u with PCP for ongoing monitoring and future interventions as needed    Homeless  2/2 to extended hospitalization  He states he did have his own apartment prior to this event, but he no longer has this.  Current plan as noted above to discharge to his father's home with his ongoing wish to attain a home of his own in the near future - he tells me he has a place he may be able to get into sooner than later  F/u with PCP/Community [facility] " for ongoing needs    Neuropathy  2/2 to subarachnoid hemorrhage as noted above  Patient states pain is well controlled on regimen   Currently on regimen of Gabapentin - Tylenol #3 prescribed also, patient has used this minimally. He will discharge home with remaining tablets on hand - will not discharge with additional prescription.   Stable without medical intervention  F/u with PCP for ongoing pain management needs    Insomnia due to medical condition  Adjustment disorder with depressed mood  Admitted on regimen of Lexapro, Remeron, Trazodone and Melatonin  He declined both Lexapro and Remeron upon arrival to the facility, he notes that he is stressed and slightly depressed at times but feels as though he is able to cope with these things at this time without these medications on board  Does tend to stick to himself, 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 - staff to encourage participation in facility activities  Last PHQ9 (3/20/18): 02/27  Both Melatonin and Trazodone have been d/c'd 2/2 non-use and ineffectiveness; Remeron and Lexapro discontinued upon admission  Patient denies ongoing difficulties with sleeping at night  Stable on current regimen; continue medications as currently ordered.  F/u with PCP for ongoing monitoring and management    Hyperlipidemia, unspecified hyperlipidemia type  Chronic  Recent Labs   Lab Test 03/16/18   CHOL  124   HDL  48   LDL  56   TRIG  99   On regimen of Pravachol   Stable on current regimen; continue medications as currently ordered.  F/u with PCP for ongoing monitoring and management    Slow transit constipation  Patient denies difficulties with constipation today  On regimen of Miralax and Nutrisource fiber  Stable on current regimen; continue medications as currently ordered.  F/u with PCP for ongoing monitoring and management    Pain  2/2 above noted diagnoses and hosptializations  Managed on Tylenol #3 and Gabapentin as  "noted above  Discharge medication plan as above  Stable on current regimen; continue medications as currently ordered.  F/u with PCP for ongoing monitoring and management    Physical deconditioning  2/2 above noted diagnoses and hospitalizations  Discharged from therapies as noted above  Will not resume therapies upon discharge  Resolved  F/u with PCP for ongoing monitoring and future interventions as needed    PAST MEDICAL HISTORY:  has no past medical history on file.    DISCHARGE MEDICATIONS:  Current Outpatient Prescriptions   Medication Sig Dispense Refill     acetaminophen-codeine (TYLENOL #3) 300-30 MG per tablet Take 1-2 tablets by mouth every 4 hours as needed for moderate pain       fiber modular (NUTRISOURCE FIBER) packet 1 packet 2 times daily as needed       gabapentin (NEURONTIN) 100 MG capsule Take 100 mg by mouth 2 times daily       polyethylene glycol (MIRALAX/GLYCOLAX) powder Take 17 g by mouth 2 times daily as needed for constipation       pravastatin (PRAVACHOL) 20 MG tablet Take 40 mg by mouth At Bedtime         MEDICATION CHANGES/RATIONALE:   No medication changes while in facility  Controlled medications sent with patient:   Medication: Tylenol #3 , Remaining tabs under patient's name in facility posession tabs given to patient at the time of discharge to take home     ROS:    10 point ROS of systems including Constitutional, Eyes, Respiratory, Cardiovascular, Gastroenterology, Genitourinary, Integumentary, Muscularskeletal, Psychiatric were all negative except for pertinent positives noted in my HPI.    Physical Exam:   Vitals: /80  Pulse 92  Temp 97.7  F (36.5  C)  Resp 20  Ht 5' 11\" (1.803 m)  Wt 174 lb 9.6 oz (79.2 kg)  SpO2 94%  BMI 24.35 kg/m2  BMI= Body mass index is 24.35 kg/(m^2).  GENERAL APPEARANCE:  Alert, in no distress, oriented, thin, somnolent, cooperative, middle-aged male ambulating in the hallway  ENT:  Mouth and posterior oropharynx normal, moist mucous " membranes, normal hearing acuity, poor dentition  EYES:  EOM, conjunctivae, lids, pupils and irises normal, wears glasses  NECK:  No adenopathy, masses or thyromegaly, trach scar present  RESP:  Respiratory effort and palpation of chest normal, lungs clear to auscultation, no respiratory distress  CV:  Palpation and auscultation of heart done, regular rate and rhythm, no murmur, rub, or gallop, no edema, +2 pedal pulses  ABDOMEN: Normal bowel sounds, soft, nontender, no hepatosplenomegaly or other masses  M/S:   Gait and station normal; Digits and nails normal  SKIN:  Inspection of skin and subcutaneous tissue baseline, Palpation of skin and subcutaneous tissue baseline  NEURO:   Cranial nerves 2-12 are normal tested and grossly at patient's baseline  PSYCH:  oriented X 3, normal insight, judgement and memory, affect and mood baseline, More friendly this visit.       BP Readings from Last 3 Encounters:   07/25/18 118/80   07/19/18 118/80   07/19/18 118/80     Pulse Readings from Last 4 Encounters:   07/25/18 92   07/19/18 92   07/19/18 66   05/14/18 88     DISCHARGE PLAN:  Home independent with no services  Patient instructed to follow-up with:  PCP in 7-10 days       [facility] scheduled appointments: None  MTM referral needed and placed by this provider: No    Pending labs: None  SNF labs   CBC RESULTS:   Recent Labs   Lab Test 07/23/18 03/16/18   WBC   --   7.9   RBC   --   3.80*   HGB  15.3  12.0*   HCT   --   37.4*   MCV   --   98.4   RDW   --   13.8   PLT  326  327     Last Basic Metabolic Panel:  Recent Labs   Lab Test 07/20/18 03/16/18   NA   --   140   POTASSIUM   --   4.3   CHLORIDE   --   108   BLUE   --   9.7   CO2   --   27   BUN   --   16   CR  0.85  0.87   GLC   --   88       Discharge Treatments: Nonw    TOTAL DISCHARGE TIME:   Greater than 30 minutes  Electronically signed by:  SHANICE Markham CNP      Sincerely,        SHANICE Markham CNP

## 2018-07-31 PROBLEM — J18.9 HAP (HOSPITAL-ACQUIRED PNEUMONIA): Status: RESOLVED | Noted: 2018-03-14 | Resolved: 2018-07-31

## 2018-07-31 PROBLEM — G93.40 ACUTE ENCEPHALOPATHY: Status: RESOLVED | Noted: 2018-03-14 | Resolved: 2018-07-31

## 2018-07-31 PROBLEM — R53.81 PHYSICAL DECONDITIONING: Status: RESOLVED | Noted: 2018-04-11 | Resolved: 2018-07-31
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
## 2018-07-31 PROBLEM — E46 MALNUTRITION, UNSPECIFIED TYPE (H): Status: RESOLVED | Noted: 2018-03-14 | Resolved: 2018-07-31
